# Patient Record
Sex: MALE | Race: WHITE | NOT HISPANIC OR LATINO | URBAN - METROPOLITAN AREA
[De-identification: names, ages, dates, MRNs, and addresses within clinical notes are randomized per-mention and may not be internally consistent; named-entity substitution may affect disease eponyms.]

---

## 2023-07-31 ENCOUNTER — EMERGENCY (EMERGENCY)
Facility: HOSPITAL | Age: 33
LOS: 1 days | Discharge: HOME | End: 2023-07-31
Attending: EMERGENCY MEDICINE
Payer: COMMERCIAL

## 2023-07-31 VITALS
HEIGHT: 74 IN | TEMPERATURE: 98 F | HEART RATE: 96 BPM | RESPIRATION RATE: 18 BRPM | DIASTOLIC BLOOD PRESSURE: 93 MMHG | SYSTOLIC BLOOD PRESSURE: 146 MMHG | OXYGEN SATURATION: 96 % | WEIGHT: 184.97 LBS

## 2023-07-31 VITALS
TEMPERATURE: 99 F | SYSTOLIC BLOOD PRESSURE: 133 MMHG | HEART RATE: 97 BPM | DIASTOLIC BLOOD PRESSURE: 77 MMHG | RESPIRATION RATE: 22 BRPM | OXYGEN SATURATION: 97 %

## 2023-07-31 LAB
ALBUMIN SERPL ELPH-MCNC: 3.3 G/DL — LOW (ref 3.5–5)
ALP SERPL-CCNC: 92 U/L — SIGNIFICANT CHANGE UP (ref 40–120)
ALT FLD-CCNC: 25 U/L DA — SIGNIFICANT CHANGE UP (ref 10–60)
ANION GAP SERPL CALC-SCNC: 5 MMOL/L — SIGNIFICANT CHANGE UP (ref 5–17)
APPEARANCE UR: CLEAR — SIGNIFICANT CHANGE UP
AST SERPL-CCNC: 21 U/L — SIGNIFICANT CHANGE UP (ref 10–40)
BASOPHILS # BLD AUTO: 0.05 K/UL — SIGNIFICANT CHANGE UP (ref 0–0.2)
BASOPHILS NFR BLD AUTO: 0.6 % — SIGNIFICANT CHANGE UP (ref 0–2)
BILIRUB SERPL-MCNC: 0.2 MG/DL — SIGNIFICANT CHANGE UP (ref 0.2–1.2)
BILIRUB UR-MCNC: NEGATIVE — SIGNIFICANT CHANGE UP
BUN SERPL-MCNC: 6 MG/DL — LOW (ref 7–18)
CALCIUM SERPL-MCNC: 8.8 MG/DL — SIGNIFICANT CHANGE UP (ref 8.4–10.5)
CHLORIDE SERPL-SCNC: 104 MMOL/L — SIGNIFICANT CHANGE UP (ref 96–108)
CO2 SERPL-SCNC: 29 MMOL/L — SIGNIFICANT CHANGE UP (ref 22–31)
COLOR SPEC: YELLOW — SIGNIFICANT CHANGE UP
CREAT SERPL-MCNC: 0.86 MG/DL — SIGNIFICANT CHANGE UP (ref 0.5–1.3)
DIFF PNL FLD: NEGATIVE — SIGNIFICANT CHANGE UP
EGFR: 117 ML/MIN/1.73M2 — SIGNIFICANT CHANGE UP
EOSINOPHIL # BLD AUTO: 0.6 K/UL — HIGH (ref 0–0.5)
EOSINOPHIL NFR BLD AUTO: 7.5 % — HIGH (ref 0–6)
ETHANOL SERPL-MCNC: <3 MG/DL — SIGNIFICANT CHANGE UP (ref 0–10)
GLUCOSE SERPL-MCNC: 97 MG/DL — SIGNIFICANT CHANGE UP (ref 70–99)
GLUCOSE UR QL: NEGATIVE — SIGNIFICANT CHANGE UP
HCT VFR BLD CALC: 42.6 % — SIGNIFICANT CHANGE UP (ref 39–50)
HGB BLD-MCNC: 14.2 G/DL — SIGNIFICANT CHANGE UP (ref 13–17)
IMM GRANULOCYTES NFR BLD AUTO: 0.3 % — SIGNIFICANT CHANGE UP (ref 0–0.9)
KETONES UR-MCNC: NEGATIVE — SIGNIFICANT CHANGE UP
LACTATE SERPL-SCNC: 1.1 MMOL/L — SIGNIFICANT CHANGE UP (ref 0.7–2)
LEUKOCYTE ESTERASE UR-ACNC: NEGATIVE — SIGNIFICANT CHANGE UP
LIDOCAIN IGE QN: 128 U/L — SIGNIFICANT CHANGE UP (ref 73–393)
LYMPHOCYTES # BLD AUTO: 2.52 K/UL — SIGNIFICANT CHANGE UP (ref 1–3.3)
LYMPHOCYTES # BLD AUTO: 31.5 % — SIGNIFICANT CHANGE UP (ref 13–44)
MCHC RBC-ENTMCNC: 30 PG — SIGNIFICANT CHANGE UP (ref 27–34)
MCHC RBC-ENTMCNC: 33.3 GM/DL — SIGNIFICANT CHANGE UP (ref 32–36)
MCV RBC AUTO: 89.9 FL — SIGNIFICANT CHANGE UP (ref 80–100)
MONOCYTES # BLD AUTO: 0.55 K/UL — SIGNIFICANT CHANGE UP (ref 0–0.9)
MONOCYTES NFR BLD AUTO: 6.9 % — SIGNIFICANT CHANGE UP (ref 2–14)
NEUTROPHILS # BLD AUTO: 4.25 K/UL — SIGNIFICANT CHANGE UP (ref 1.8–7.4)
NEUTROPHILS NFR BLD AUTO: 53.2 % — SIGNIFICANT CHANGE UP (ref 43–77)
NITRITE UR-MCNC: NEGATIVE — SIGNIFICANT CHANGE UP
NRBC # BLD: 0 /100 WBCS — SIGNIFICANT CHANGE UP (ref 0–0)
PH UR: 7 — SIGNIFICANT CHANGE UP (ref 5–8)
PLATELET # BLD AUTO: 296 K/UL — SIGNIFICANT CHANGE UP (ref 150–400)
POTASSIUM SERPL-MCNC: 3.8 MMOL/L — SIGNIFICANT CHANGE UP (ref 3.5–5.3)
POTASSIUM SERPL-SCNC: 3.8 MMOL/L — SIGNIFICANT CHANGE UP (ref 3.5–5.3)
PROT SERPL-MCNC: 7.4 G/DL — SIGNIFICANT CHANGE UP (ref 6–8.3)
PROT UR-MCNC: 15 MG/DL
RBC # BLD: 4.74 M/UL — SIGNIFICANT CHANGE UP (ref 4.2–5.8)
RBC # FLD: 12 % — SIGNIFICANT CHANGE UP (ref 10.3–14.5)
SODIUM SERPL-SCNC: 138 MMOL/L — SIGNIFICANT CHANGE UP (ref 135–145)
SP GR SPEC: 1.01 — SIGNIFICANT CHANGE UP (ref 1.01–1.02)
UROBILINOGEN FLD QL: NEGATIVE — SIGNIFICANT CHANGE UP
WBC # BLD: 7.99 K/UL — SIGNIFICANT CHANGE UP (ref 3.8–10.5)
WBC # FLD AUTO: 7.99 K/UL — SIGNIFICANT CHANGE UP (ref 3.8–10.5)

## 2023-07-31 PROCEDURE — 83690 ASSAY OF LIPASE: CPT

## 2023-07-31 PROCEDURE — 83605 ASSAY OF LACTIC ACID: CPT

## 2023-07-31 PROCEDURE — 81001 URINALYSIS AUTO W/SCOPE: CPT

## 2023-07-31 PROCEDURE — 93010 ELECTROCARDIOGRAM REPORT: CPT

## 2023-07-31 PROCEDURE — 85025 COMPLETE CBC W/AUTO DIFF WBC: CPT

## 2023-07-31 PROCEDURE — 70450 CT HEAD/BRAIN W/O DYE: CPT | Mod: MA

## 2023-07-31 PROCEDURE — 93005 ELECTROCARDIOGRAM TRACING: CPT

## 2023-07-31 PROCEDURE — 36415 COLL VENOUS BLD VENIPUNCTURE: CPT

## 2023-07-31 PROCEDURE — 99285 EMERGENCY DEPT VISIT HI MDM: CPT | Mod: 25

## 2023-07-31 PROCEDURE — 99285 EMERGENCY DEPT VISIT HI MDM: CPT

## 2023-07-31 PROCEDURE — 82962 GLUCOSE BLOOD TEST: CPT

## 2023-07-31 PROCEDURE — 80053 COMPREHEN METABOLIC PANEL: CPT

## 2023-07-31 PROCEDURE — 80307 DRUG TEST PRSMV CHEM ANLYZR: CPT

## 2023-07-31 PROCEDURE — 70450 CT HEAD/BRAIN W/O DYE: CPT | Mod: 26,MA

## 2023-07-31 RX ORDER — SODIUM CHLORIDE 9 MG/ML
1000 INJECTION INTRAMUSCULAR; INTRAVENOUS; SUBCUTANEOUS ONCE
Refills: 0 | Status: COMPLETED | OUTPATIENT
Start: 2023-07-31 | End: 2023-07-31

## 2023-07-31 RX ADMIN — SODIUM CHLORIDE 1000 MILLILITER(S): 9 INJECTION INTRAMUSCULAR; INTRAVENOUS; SUBCUTANEOUS at 18:04

## 2023-07-31 NOTE — ED PROVIDER NOTE - OBJECTIVE STATEMENT
32-year-old male with history of alcohol abuse, polysubstance use, presents with grandmother as grandmother states that she came up to his apartment and noted that he was making strange movements of his head to the side and thrusting his tongue and making noises with his lips. She states she thinks this may be secondary to his alcohol use, and also that he uses oxycodone as prescribed for chronic leg pain. She also states that he appeared to be doubled over in pain while making these noises. She denies all other specific symptoms or complaints. Patient himself states that he has been drinking alcohol as well as using his oxycodone only as prescribed, but when interviewed separately from grandmother states has been using cocaine as well over the past 3 days gotten from a party. He reports chronic liver pain, no acute change. When asked, states he may have hit his head a few days ago though does not think so. Denies chest pain, shortness of breath, vomiting, and all other symptoms. Denies history of seizures.

## 2023-07-31 NOTE — ED PROVIDER NOTE - PATIENT PORTAL LINK FT
You can access the FollowMyHealth Patient Portal offered by [facility] by registering at the following website: [email or web address]. By joining [employer]’s FollowMyHealth portal, you will also be able to view your health information using other applications (apps) compatible with our system.

## 2023-07-31 NOTE — ED PROVIDER NOTE - NSFOLLOWUPCLINICS_GEN_ALL_ED_FT
[facility]  Detox  [address] [place] [address]  Phone: [phone]  Fax: [phone]  Follow Up Time: Urgent    [facility]  Detox  [address] [place] [address]  Phone: [phone]  Fax: [phone]  Follow Up Time: Urgent    [facility]  Internal Medicine  [address] [place] [address]  Phone: [phone]  Fax: [phone]  Follow Up Time: Urgent

## 2023-07-31 NOTE — ED PROVIDER NOTE - NSFOLLOWUPINSTRUCTIONS_ED_ALL_ED_FT
Please follow up with your primary care doctor in 1-2 days.  Please stop using alcohol and substances.  Please return to the emergency department if you have a change in your mental status, worsening abdominal pain, vomiting, fever, or any other symptoms.      Substance Use  Substance use disorder occurs when a person's repeated use of drugs or alcohol interferes with the ability to be productive. This disorder can cause problems with mental and physical health. It can affect your ability to have healthy relationships, and it can keep you from being able to meet your responsibilities at work, home, or school. It can also lead to addiction, which is a condition in which you cannot stop using the substance consistently for a period of time.    Addiction changes the way the brain works. Because of these changes, addiction is a chronic condition. Substance use disorder can be mild, moderate, or severe.    Some commonly misused substances that can lead to this disorder include:  Alcohol.  Tobacco.  Marijuana.  Stimulants, such as cocaine and methamphetamine.  Hallucinogens, such as LSD and PCP.  Opioids, such as some prescription pain medicines and heroin.  What are the causes?  This condition may develop due to many complex social, psychological, or physical reasons, such as:  Stress.  Abuse.  Peer pressure.  Anxiety or depression.  What increases the risk?  This condition is more likely to develop in people who:  Use substances to cope with stress.  Have been abused.  Have a mental health disorder, such as depression.  Have a family history of substance use disorder.  What are the signs or symptoms?  Symptoms of this condition include:  Using the substance for longer periods of time or at a higher dosage than what is normal or intended.  Having a lasting desire to use the substance.  Being unable to slow down or stop the use of the substance.  Spending an abnormal amount of time getting the substance, using the substance, or recovering from using the substance.  Using the substance in a way that interferes with work, school, social activities, and personal relationships.  Using the substance even after having negative consequences, such as:  Health problems.  Legal or financial troubles.  Job loss.  Relationship problems.  Needing more and more of the substance to get the same effect (developing tolerance).  Experiencing unpleasant symptoms if you do not use the substance (withdrawal).  Using the substance to avoid withdrawal symptoms.  How is this diagnosed?  This condition may be diagnosed based on:  A physical exam.  Your history of substance use.  Your symptoms. This includes:  How substance use affects your life.  Changes in personality, behaviors, and mood.  Having at least two symptoms of substance use disorder within a 12-month period.  Health issues related to substance use, such as liver damage, shortness of breath, fatigue, cough, or heart problems.  Blood or urine tests to screen for alcohol and drugs.  How is this treated?  Three people participating in a support group.  This condition may be treated by:  Stopping substance use safely. This may require taking medicines and being closely monitored for several days.  Taking part in group and individual counseling from mental health providers who help people with substance use disorder.  Staying at a live-in (residential) treatment center for several days or weeks.  Attending daily counseling sessions at a treatment center.  Taking medicine as told by your health care provider:  To ease symptoms and prevent complications during withdrawal.  To treat other mental health issues, such as depression or anxiety.  To block cravings by causing the same effects as the substance.  To block the effects of the substance or replace good sensations with unpleasant ones.  Participating in a support group to share your experience with others who are going through the same thing. These groups are an important part of long-term recovery for many people.  Recovery can be a long process. Many people who undergo treatment start using the substance again after stopping (relapse). If you relapse, that does not mean that treatment will not work.    Follow these instructions at home:  A bottle of beer, a glass of wine, and a glass of hard liquor with a "do not drink" sign over them.   Take over-the-counter and prescription medicines only as told by your health care provider.  Do not use any drugs or alcohol.  Avoid temptations or triggers that you associate with your use of the substance.  Learn and practice techniques for managing stress.  Have a plan for vulnerable moments. Get phone numbers of people who are willing to help and who are committed to your recovery.  Attend support groups on a regular basis. These groups include 12-step programs like Alcoholics Anonymous and Narcotics Anonymous.  Keep all follow-up visits. This is important. This includes continuing to work with therapists and support groups.  Where to find more information  Substance Abuse and Mental Health Services Administration (SAMHSA): www.samhsa.gov  National [place]ness (HANK): www.hank.org  Contact a health care provider if:  You cannot take your medicines as told.  Your symptoms get worse.  You have trouble resisting the urge to use drugs or alcohol.  Get help right away if:  You relapse.  You think that you may have taken too much of a drug. The National Poison Control Center hotline is 1-838.977.4663.  You have signs of an overdose. Symptoms include:  Chest pain.  Confusion.  Sleepiness or difficulty staying awake.  Slowed breathing.  Nausea or vomiting.  A seizure.  You have serious thoughts about hurting yourself or someone else.  Drug overdose is an emergency. Do not wait to see if the symptoms will go away. Get medical help right away. Call your local emergency services (353 in the [place] Do not drive yourself to the hospital.    If you ever feel like you may hurt yourself or others, or have thoughts about taking your own life, get help right away. Go to your nearest emergency department or:  Call your local emergency services (722 in the [place]  Call a suicide crisis helpline, such as the National Suicide Prevention Lifeline at 1-463.617.5327 or 132 in the [place] This is open 24 hours a day in the [place]  Text the Crisis Text Line at 892947 (in the [place]  Summary  Substance use disorder occurs when a person's repeated use of drugs or alcohol interferes with the ability to be productive.  Taking part in group and individual counseling from mental health providers is a common treatment for people with substance use disorder.  Recovery can be a long process. Many people who undergo treatment start using the substance again after stopping (relapse). A relapse does not mean that treatment will not work.  Attend support groups such as Alcoholics Anonymous and Narcotics Anonymous. These groups are an important part of long-term recovery for many people.  This information is not intended to replace advice given to you by your health care provider. Make sure you discuss any questions you have with your health care provider. Please follow up with your primary care doctor in 1-2 days, and detox tomorrow.  Please stop using alcohol and substances.  Please return to the emergency department if you have a change in your mental status, worsening abdominal pain, vomiting, fever, or any other symptoms.      Substance Use  Substance use disorder occurs when a person's repeated use of drugs or alcohol interferes with the ability to be productive. This disorder can cause problems with mental and physical health. It can affect your ability to have healthy relationships, and it can keep you from being able to meet your responsibilities at work, home, or school. It can also lead to addiction, which is a condition in which you cannot stop using the substance consistently for a period of time.    Addiction changes the way the brain works. Because of these changes, addiction is a chronic condition. Substance use disorder can be mild, moderate, or severe.    Some commonly misused substances that can lead to this disorder include:  Alcohol.  Tobacco.  Marijuana.  Stimulants, such as cocaine and methamphetamine.  Hallucinogens, such as LSD and PCP.  Opioids, such as some prescription pain medicines and heroin.  What are the causes?  This condition may develop due to many complex social, psychological, or physical reasons, such as:  Stress.  Abuse.  Peer pressure.  Anxiety or depression.  What increases the risk?  This condition is more likely to develop in people who:  Use substances to cope with stress.  Have been abused.  Have a mental health disorder, such as depression.  Have a family history of substance use disorder.  What are the signs or symptoms?  Symptoms of this condition include:  Using the substance for longer periods of time or at a higher dosage than what is normal or intended.  Having a lasting desire to use the substance.  Being unable to slow down or stop the use of the substance.  Spending an abnormal amount of time getting the substance, using the substance, or recovering from using the substance.  Using the substance in a way that interferes with work, school, social activities, and personal relationships.  Using the substance even after having negative consequences, such as:  Health problems.  Legal or financial troubles.  Job loss.  Relationship problems.  Needing more and more of the substance to get the same effect (developing tolerance).  Experiencing unpleasant symptoms if you do not use the substance (withdrawal).  Using the substance to avoid withdrawal symptoms.  How is this diagnosed?  This condition may be diagnosed based on:  A physical exam.  Your history of substance use.  Your symptoms. This includes:  How substance use affects your life.  Changes in personality, behaviors, and mood.  Having at least two symptoms of substance use disorder within a 12-month period.  Health issues related to substance use, such as liver damage, shortness of breath, fatigue, cough, or heart problems.  Blood or urine tests to screen for alcohol and drugs.  How is this treated?  Three people participating in a support group.  This condition may be treated by:  Stopping substance use safely. This may require taking medicines and being closely monitored for several days.  Taking part in group and individual counseling from mental health providers who help people with substance use disorder.  Staying at a live-in (residential) treatment center for several days or weeks.  Attending daily counseling sessions at a treatment center.  Taking medicine as told by your health care provider:  To ease symptoms and prevent complications during withdrawal.  To treat other mental health issues, such as depression or anxiety.  To block cravings by causing the same effects as the substance.  To block the effects of the substance or replace good sensations with unpleasant ones.  Participating in a support group to share your experience with others who are going through the same thing. These groups are an important part of long-term recovery for many people.  Recovery can be a long process. Many people who undergo treatment start using the substance again after stopping (relapse). If you relapse, that does not mean that treatment will not work.    Follow these instructions at home:  A bottle of beer, a glass of wine, and a glass of hard liquor with a "do not drink" sign over them.   Take over-the-counter and prescription medicines only as told by your health care provider.  Do not use any drugs or alcohol.  Avoid temptations or triggers that you associate with your use of the substance.  Learn and practice techniques for managing stress.  Have a plan for vulnerable moments. Get phone numbers of people who are willing to help and who are committed to your recovery.  Attend support groups on a regular basis. These groups include 12-step programs like Alcoholics Anonymous and Narcotics Anonymous.  Keep all follow-up visits. This is important. This includes continuing to work with therapists and support groups.  Where to find more information  Substance Abuse and Mental Health Services Administration (SAMHSA): www.samhsa.gov  National [place] (HANK): www.hank.org  Contact a health care provider if:  You cannot take your medicines as told.  Your symptoms get worse.  You have trouble resisting the urge to use drugs or alcohol.  Get help right away if:  You relapse.  You think that you may have taken too much of a drug. The National Poison Control Center hotline is 1-824.115.2392.  You have signs of an overdose. Symptoms include:  Chest pain.  Confusion.  Sleepiness or difficulty staying awake.  Slowed breathing.  Nausea or vomiting.  A seizure.  You have serious thoughts about hurting yourself or someone else.  Drug overdose is an emergency. Do not wait to see if the symptoms will go away. Get medical help right away. Call your local emergency services (492 in the [place] Do not drive yourself to the hospital.    If you ever feel like you may hurt yourself or others, or have thoughts about taking your own life, get help right away. Go to your nearest emergency department or:  Call your local emergency services (208 in the [place]  Call a suicide crisis helpline, such as the National Suicide Prevention Lifeline at 1-840.882.4163 or 733 in the [place] This is open 24 hours a day in the [place]  Text the Crisis Text Line at 333071 (in the [place]  Summary  Substance use disorder occurs when a person's repeated use of drugs or alcohol interferes with the ability to be productive.  Taking part in group and individual counseling from mental health providers is a common treatment for people with substance use disorder.  Recovery can be a long process. Many people who undergo treatment start using the substance again after stopping (relapse). A relapse does not mean that treatment will not work.  Attend support groups such as Alcoholics Anonymous and Narcotics Anonymous. These groups are an important part of long-term recovery for many people.  This information is not intended to replace advice given to you by your health care provider. Make sure you discuss any questions you have with your health care provider.

## 2023-07-31 NOTE — ED ADULT NURSE NOTE - OBJECTIVE STATEMENT
Patient presented to the ED with c/o RUQ pain. As per pt the pain is coming from his liver. H/o ETOH. Denies any nausea, vomiting diarrhea and constipation. Pt took 30mg of Oxycodone for pain around 11AM as per pt.

## 2023-07-31 NOTE — ED ADULT NURSE REASSESSMENT NOTE - NS ED NURSE REASSESS COMMENT FT1
assumed pt care at 1910 - pt currently asleep, not in any distress. family at bedside.  2040 Dr. Garcia at bedside - pt able to ambulate with steady gait. provider at bedside explaining importance of alcohol cessation.

## 2023-07-31 NOTE — ED ADULT NURSE NOTE - NSFALLRISKINTERV_ED_ALL_ED
Assistance OOB with selected safe patient handling equipment if applicable/Assistance with ambulation/Communicate fall risk and risk factors to all staff, patient, and family/Monitor gait and stability/Monitor for mental status changes and reorient to person, place, and time, as needed/Provide visual cue: yellow wristband, yellow gown, etc/Reinforce activity limits and safety measures with patient and family/Toileting schedule using arm’s reach rule for commode and bathroom/Use of alarms - bed, stretcher, chair and/or video monitoring/Call bell, personal items and telephone in reach/Instruct patient to call for assistance before getting out of bed/chair/stretcher/Non-slip footwear applied when patient is off stretcher/[place] to call system/Physically safe environment - no spills, clutter or unnecessary equipment/Purposeful Proactive Rounding/Room/bathroom lighting operational, light cord in reach

## 2023-07-31 NOTE — ED PROVIDER NOTE - CLINICAL SUMMARY MEDICAL DECISION MAKING FREE TEXT BOX
Character low suspicion for seizure, and patient with no history of seizures and was alert during episodes. Grandmother also states she has seen grand mal seizures in the past and this did not appear to be long. No evidence of serotonin syndrome or neuroleptic malignant syndrome. Character low suspicion for ICH, and CT head unremarkable. _______. No abdominal tenderness or acute pain, with low suspicion for acute intra-abdominal process, and LFTs unremarkable. Character could be consistent with patient's cocaine use. Patient with some mild hyperactivity noted here, however nontoxic and controlled. Self ambulatory without issues. Given IV fluids. Character low suspicion for seizure, and patient with no history of seizures and was alert during episodes. Grandmother also states she has seen grand mal seizures in the past and this did not appear to be long. No evidence of serotonin syndrome or neuroleptic malignant syndrome. Character low suspicion for ICH, and CT head unremarkable. No abdominal tenderness or acute pain, with low suspicion for acute intra-abdominal process, and LFTs unremarkable. Character could be consistent with patient's cocaine use. Patient with some mild hyperactivity noted here, however nontoxic and controlled. Self ambulatory without issues. Given IV fluids. Character low suspicion for seizure, and patient with no history of seizures and was alert during episodes. Grandmother also states she has seen grand mal seizures in the past and this did not appear to be long. No evidence of serotonin syndrome or neuroleptic malignant syndrome. Character low suspicion for ICH, and CT head unremarkable. No abdominal tenderness or acute pain, with low suspicion for acute intra-abdominal process, and LFTs unremarkable. Character could be consistent with patient's cocaine use. Patient with some mild hyperactivity noted here, however nontoxic and controlled. Self ambulatory without issues. Given IV fluids. Patient is well appearing, NAD, afebrile, hemodynamically stable. Walking independently and stably and improved movements and does not want to be admitted. Any available tests and studies were discussed with patient and mother at bedside. Discharged with instructions in further symptomatic care, return precautions, and need for PMD establishment and detox f/u tomorrow.

## 2023-07-31 NOTE — ED PROVIDER NOTE - PHYSICAL EXAMINATION
Afebrile, hemodynamically stable, saturating well on room air  NAD, well appearing, sleeping comfortably in bed, easily awakened, no WOB/tachypnea/bradypnea, speaking full sentences  Head NCAT  EOMI, anicteric  MMM  RRR, nml S1/S2, no m/r/g  Lungs CTAB, no w/r/r  Abd soft, NT, ND, nml BS, no rebound or guarding  AAO, CN's 3-12 grossly intact  CHARLES spontaneously, no leg cyanosis or edema, no clonus or stiffness  Skin warm, well perfused, no rashes or hives

## 2023-11-21 ENCOUNTER — INPATIENT (INPATIENT)
Facility: HOSPITAL | Age: 33
LOS: 8 days | Discharge: HOME | End: 2023-11-30
Attending: STUDENT IN AN ORGANIZED HEALTH CARE EDUCATION/TRAINING PROGRAM | Admitting: PSYCHIATRY & NEUROLOGY
Payer: MEDICAID

## 2023-11-21 VITALS
OXYGEN SATURATION: 100 % | SYSTOLIC BLOOD PRESSURE: 163 MMHG | HEART RATE: 103 BPM | RESPIRATION RATE: 18 BRPM | TEMPERATURE: 99 F | DIASTOLIC BLOOD PRESSURE: 110 MMHG

## 2023-11-21 DIAGNOSIS — Z78.9 OTHER SPECIFIED HEALTH STATUS: ICD-10-CM

## 2023-11-21 DIAGNOSIS — F14.11 COCAINE ABUSE, IN REMISSION: ICD-10-CM

## 2023-11-21 DIAGNOSIS — F10.10 ALCOHOL ABUSE, UNCOMPLICATED: ICD-10-CM

## 2023-11-21 DIAGNOSIS — F14.10 COCAINE ABUSE, UNCOMPLICATED: ICD-10-CM

## 2023-11-21 DIAGNOSIS — F43.21 ADJUSTMENT DISORDER WITH DEPRESSED MOOD: ICD-10-CM

## 2023-11-21 DIAGNOSIS — F32.9 MAJOR DEPRESSIVE DISORDER, SINGLE EPISODE, UNSPECIFIED: ICD-10-CM

## 2023-11-21 LAB
ALBUMIN SERPL ELPH-MCNC: 4.7 G/DL — SIGNIFICANT CHANGE UP (ref 3.3–5)
ALBUMIN SERPL ELPH-MCNC: 4.7 G/DL — SIGNIFICANT CHANGE UP (ref 3.3–5)
ALP SERPL-CCNC: 87 U/L — SIGNIFICANT CHANGE UP (ref 40–120)
ALP SERPL-CCNC: 87 U/L — SIGNIFICANT CHANGE UP (ref 40–120)
ALT FLD-CCNC: 15 U/L — SIGNIFICANT CHANGE UP (ref 4–41)
ALT FLD-CCNC: 15 U/L — SIGNIFICANT CHANGE UP (ref 4–41)
ANION GAP SERPL CALC-SCNC: 12 MMOL/L — SIGNIFICANT CHANGE UP (ref 7–14)
ANION GAP SERPL CALC-SCNC: 12 MMOL/L — SIGNIFICANT CHANGE UP (ref 7–14)
APPEARANCE UR: ABNORMAL
APPEARANCE UR: ABNORMAL
AST SERPL-CCNC: 12 U/L — SIGNIFICANT CHANGE UP (ref 4–40)
AST SERPL-CCNC: 12 U/L — SIGNIFICANT CHANGE UP (ref 4–40)
BASOPHILS # BLD AUTO: 0.07 K/UL — SIGNIFICANT CHANGE UP (ref 0–0.2)
BASOPHILS # BLD AUTO: 0.07 K/UL — SIGNIFICANT CHANGE UP (ref 0–0.2)
BASOPHILS NFR BLD AUTO: 0.7 % — SIGNIFICANT CHANGE UP (ref 0–2)
BASOPHILS NFR BLD AUTO: 0.7 % — SIGNIFICANT CHANGE UP (ref 0–2)
BILIRUB SERPL-MCNC: 0.4 MG/DL — SIGNIFICANT CHANGE UP (ref 0.2–1.2)
BILIRUB SERPL-MCNC: 0.4 MG/DL — SIGNIFICANT CHANGE UP (ref 0.2–1.2)
BILIRUB UR-MCNC: NEGATIVE — SIGNIFICANT CHANGE UP
BILIRUB UR-MCNC: NEGATIVE — SIGNIFICANT CHANGE UP
BUN SERPL-MCNC: 15 MG/DL — SIGNIFICANT CHANGE UP (ref 7–23)
BUN SERPL-MCNC: 15 MG/DL — SIGNIFICANT CHANGE UP (ref 7–23)
CALCIUM SERPL-MCNC: 9.6 MG/DL — SIGNIFICANT CHANGE UP (ref 8.4–10.5)
CALCIUM SERPL-MCNC: 9.6 MG/DL — SIGNIFICANT CHANGE UP (ref 8.4–10.5)
CHLORIDE SERPL-SCNC: 100 MMOL/L — SIGNIFICANT CHANGE UP (ref 98–107)
CHLORIDE SERPL-SCNC: 100 MMOL/L — SIGNIFICANT CHANGE UP (ref 98–107)
CO2 SERPL-SCNC: 26 MMOL/L — SIGNIFICANT CHANGE UP (ref 22–31)
CO2 SERPL-SCNC: 26 MMOL/L — SIGNIFICANT CHANGE UP (ref 22–31)
COLOR SPEC: YELLOW — SIGNIFICANT CHANGE UP
COLOR SPEC: YELLOW — SIGNIFICANT CHANGE UP
CREAT SERPL-MCNC: 0.7 MG/DL — SIGNIFICANT CHANGE UP (ref 0.5–1.3)
CREAT SERPL-MCNC: 0.7 MG/DL — SIGNIFICANT CHANGE UP (ref 0.5–1.3)
DIFF PNL FLD: NEGATIVE — SIGNIFICANT CHANGE UP
DIFF PNL FLD: NEGATIVE — SIGNIFICANT CHANGE UP
EGFR: 125 ML/MIN/1.73M2 — SIGNIFICANT CHANGE UP
EGFR: 125 ML/MIN/1.73M2 — SIGNIFICANT CHANGE UP
EOSINOPHIL # BLD AUTO: 0.88 K/UL — HIGH (ref 0–0.5)
EOSINOPHIL # BLD AUTO: 0.88 K/UL — HIGH (ref 0–0.5)
EOSINOPHIL NFR BLD AUTO: 8.4 % — HIGH (ref 0–6)
EOSINOPHIL NFR BLD AUTO: 8.4 % — HIGH (ref 0–6)
GLUCOSE SERPL-MCNC: 89 MG/DL — SIGNIFICANT CHANGE UP (ref 70–99)
GLUCOSE SERPL-MCNC: 89 MG/DL — SIGNIFICANT CHANGE UP (ref 70–99)
GLUCOSE UR QL: NEGATIVE MG/DL — SIGNIFICANT CHANGE UP
GLUCOSE UR QL: NEGATIVE MG/DL — SIGNIFICANT CHANGE UP
HCT VFR BLD CALC: 42.2 % — SIGNIFICANT CHANGE UP (ref 39–50)
HCT VFR BLD CALC: 42.2 % — SIGNIFICANT CHANGE UP (ref 39–50)
HGB BLD-MCNC: 13.7 G/DL — SIGNIFICANT CHANGE UP (ref 13–17)
HGB BLD-MCNC: 13.7 G/DL — SIGNIFICANT CHANGE UP (ref 13–17)
IANC: 5.46 K/UL — SIGNIFICANT CHANGE UP (ref 1.8–7.4)
IANC: 5.46 K/UL — SIGNIFICANT CHANGE UP (ref 1.8–7.4)
IMM GRANULOCYTES NFR BLD AUTO: 0.2 % — SIGNIFICANT CHANGE UP (ref 0–0.9)
IMM GRANULOCYTES NFR BLD AUTO: 0.2 % — SIGNIFICANT CHANGE UP (ref 0–0.9)
KETONES UR-MCNC: NEGATIVE MG/DL — SIGNIFICANT CHANGE UP
KETONES UR-MCNC: NEGATIVE MG/DL — SIGNIFICANT CHANGE UP
LEUKOCYTE ESTERASE UR-ACNC: NEGATIVE — SIGNIFICANT CHANGE UP
LEUKOCYTE ESTERASE UR-ACNC: NEGATIVE — SIGNIFICANT CHANGE UP
LYMPHOCYTES # BLD AUTO: 3.53 K/UL — HIGH (ref 1–3.3)
LYMPHOCYTES # BLD AUTO: 3.53 K/UL — HIGH (ref 1–3.3)
LYMPHOCYTES # BLD AUTO: 33.7 % — SIGNIFICANT CHANGE UP (ref 13–44)
LYMPHOCYTES # BLD AUTO: 33.7 % — SIGNIFICANT CHANGE UP (ref 13–44)
MCHC RBC-ENTMCNC: 28.5 PG — SIGNIFICANT CHANGE UP (ref 27–34)
MCHC RBC-ENTMCNC: 28.5 PG — SIGNIFICANT CHANGE UP (ref 27–34)
MCHC RBC-ENTMCNC: 32.5 GM/DL — SIGNIFICANT CHANGE UP (ref 32–36)
MCHC RBC-ENTMCNC: 32.5 GM/DL — SIGNIFICANT CHANGE UP (ref 32–36)
MCV RBC AUTO: 87.9 FL — SIGNIFICANT CHANGE UP (ref 80–100)
MCV RBC AUTO: 87.9 FL — SIGNIFICANT CHANGE UP (ref 80–100)
MONOCYTES # BLD AUTO: 0.53 K/UL — SIGNIFICANT CHANGE UP (ref 0–0.9)
MONOCYTES # BLD AUTO: 0.53 K/UL — SIGNIFICANT CHANGE UP (ref 0–0.9)
MONOCYTES NFR BLD AUTO: 5.1 % — SIGNIFICANT CHANGE UP (ref 2–14)
MONOCYTES NFR BLD AUTO: 5.1 % — SIGNIFICANT CHANGE UP (ref 2–14)
NEUTROPHILS # BLD AUTO: 5.46 K/UL — SIGNIFICANT CHANGE UP (ref 1.8–7.4)
NEUTROPHILS # BLD AUTO: 5.46 K/UL — SIGNIFICANT CHANGE UP (ref 1.8–7.4)
NEUTROPHILS NFR BLD AUTO: 51.9 % — SIGNIFICANT CHANGE UP (ref 43–77)
NEUTROPHILS NFR BLD AUTO: 51.9 % — SIGNIFICANT CHANGE UP (ref 43–77)
NITRITE UR-MCNC: NEGATIVE — SIGNIFICANT CHANGE UP
NITRITE UR-MCNC: NEGATIVE — SIGNIFICANT CHANGE UP
NRBC # BLD: 0 /100 WBCS — SIGNIFICANT CHANGE UP (ref 0–0)
NRBC # BLD: 0 /100 WBCS — SIGNIFICANT CHANGE UP (ref 0–0)
NRBC # FLD: 0 K/UL — SIGNIFICANT CHANGE UP (ref 0–0)
NRBC # FLD: 0 K/UL — SIGNIFICANT CHANGE UP (ref 0–0)
PCP SPEC-MCNC: SIGNIFICANT CHANGE UP
PCP SPEC-MCNC: SIGNIFICANT CHANGE UP
PH UR: 5.5 — SIGNIFICANT CHANGE UP (ref 5–8)
PH UR: 5.5 — SIGNIFICANT CHANGE UP (ref 5–8)
PLATELET # BLD AUTO: 281 K/UL — SIGNIFICANT CHANGE UP (ref 150–400)
PLATELET # BLD AUTO: 281 K/UL — SIGNIFICANT CHANGE UP (ref 150–400)
POTASSIUM SERPL-MCNC: 3.6 MMOL/L — SIGNIFICANT CHANGE UP (ref 3.5–5.3)
POTASSIUM SERPL-MCNC: 3.6 MMOL/L — SIGNIFICANT CHANGE UP (ref 3.5–5.3)
POTASSIUM SERPL-SCNC: 3.6 MMOL/L — SIGNIFICANT CHANGE UP (ref 3.5–5.3)
POTASSIUM SERPL-SCNC: 3.6 MMOL/L — SIGNIFICANT CHANGE UP (ref 3.5–5.3)
PROT SERPL-MCNC: 7.5 G/DL — SIGNIFICANT CHANGE UP (ref 6–8.3)
PROT SERPL-MCNC: 7.5 G/DL — SIGNIFICANT CHANGE UP (ref 6–8.3)
PROT UR-MCNC: SIGNIFICANT CHANGE UP MG/DL
PROT UR-MCNC: SIGNIFICANT CHANGE UP MG/DL
RBC # BLD: 4.8 M/UL — SIGNIFICANT CHANGE UP (ref 4.2–5.8)
RBC # BLD: 4.8 M/UL — SIGNIFICANT CHANGE UP (ref 4.2–5.8)
RBC # FLD: 13.1 % — SIGNIFICANT CHANGE UP (ref 10.3–14.5)
RBC # FLD: 13.1 % — SIGNIFICANT CHANGE UP (ref 10.3–14.5)
SARS-COV-2 RNA SPEC QL NAA+PROBE: SIGNIFICANT CHANGE UP
SARS-COV-2 RNA SPEC QL NAA+PROBE: SIGNIFICANT CHANGE UP
SODIUM SERPL-SCNC: 138 MMOL/L — SIGNIFICANT CHANGE UP (ref 135–145)
SODIUM SERPL-SCNC: 138 MMOL/L — SIGNIFICANT CHANGE UP (ref 135–145)
SP GR SPEC: 1.01 — SIGNIFICANT CHANGE UP (ref 1–1.03)
SP GR SPEC: 1.01 — SIGNIFICANT CHANGE UP (ref 1–1.03)
TOXICOLOGY SCREEN, DRUGS OF ABUSE, SERUM RESULT: SIGNIFICANT CHANGE UP
TOXICOLOGY SCREEN, DRUGS OF ABUSE, SERUM RESULT: SIGNIFICANT CHANGE UP
TSH SERPL-MCNC: 2.14 UIU/ML — SIGNIFICANT CHANGE UP (ref 0.27–4.2)
TSH SERPL-MCNC: 2.14 UIU/ML — SIGNIFICANT CHANGE UP (ref 0.27–4.2)
UROBILINOGEN FLD QL: 0.2 MG/DL — SIGNIFICANT CHANGE UP (ref 0.2–1)
UROBILINOGEN FLD QL: 0.2 MG/DL — SIGNIFICANT CHANGE UP (ref 0.2–1)
WBC # BLD: 10.49 K/UL — SIGNIFICANT CHANGE UP (ref 3.8–10.5)
WBC # BLD: 10.49 K/UL — SIGNIFICANT CHANGE UP (ref 3.8–10.5)
WBC # FLD AUTO: 10.49 K/UL — SIGNIFICANT CHANGE UP (ref 3.8–10.5)
WBC # FLD AUTO: 10.49 K/UL — SIGNIFICANT CHANGE UP (ref 3.8–10.5)

## 2023-11-21 PROCEDURE — 99285 EMERGENCY DEPT VISIT HI MDM: CPT

## 2023-11-21 PROCEDURE — 99285 EMERGENCY DEPT VISIT HI MDM: CPT | Mod: 25

## 2023-11-21 RX ORDER — NICOTINE POLACRILEX 2 MG
1 GUM BUCCAL ONCE
Refills: 0 | Status: DISCONTINUED | OUTPATIENT
Start: 2023-11-21 | End: 2023-11-30

## 2023-11-21 RX ORDER — HALOPERIDOL DECANOATE 100 MG/ML
5 INJECTION INTRAMUSCULAR EVERY 6 HOURS
Refills: 0 | Status: DISCONTINUED | OUTPATIENT
Start: 2023-11-21 | End: 2023-11-21

## 2023-11-21 RX ORDER — SERTRALINE 25 MG/1
25 TABLET, FILM COATED ORAL DAILY
Refills: 0 | Status: DISCONTINUED | OUTPATIENT
Start: 2023-11-22 | End: 2023-11-23

## 2023-11-21 RX ORDER — NICOTINE POLACRILEX 2 MG
4 GUM BUCCAL
Refills: 0 | Status: DISCONTINUED | OUTPATIENT
Start: 2023-11-21 | End: 2023-11-30

## 2023-11-21 RX ORDER — ACETAMINOPHEN 500 MG
650 TABLET ORAL EVERY 6 HOURS
Refills: 0 | Status: DISCONTINUED | OUTPATIENT
Start: 2023-11-21 | End: 2023-11-30

## 2023-11-21 RX ORDER — OLANZAPINE 15 MG/1
5 TABLET, FILM COATED ORAL EVERY 6 HOURS
Refills: 0 | Status: DISCONTINUED | OUTPATIENT
Start: 2023-11-21 | End: 2023-11-30

## 2023-11-21 RX ORDER — OLANZAPINE 15 MG/1
5 TABLET, FILM COATED ORAL ONCE
Refills: 0 | Status: DISCONTINUED | OUTPATIENT
Start: 2023-11-21 | End: 2023-11-30

## 2023-11-21 RX ORDER — THIAMINE MONONITRATE (VIT B1) 100 MG
100 TABLET ORAL DAILY
Refills: 0 | Status: COMPLETED | OUTPATIENT
Start: 2023-11-21 | End: 2023-11-24

## 2023-11-21 RX ORDER — FOLIC ACID 0.8 MG
1 TABLET ORAL DAILY
Refills: 0 | Status: COMPLETED | OUTPATIENT
Start: 2023-11-21 | End: 2023-11-28

## 2023-11-21 RX ORDER — LANOLIN ALCOHOL/MO/W.PET/CERES
5 CREAM (GRAM) TOPICAL AT BEDTIME
Refills: 0 | Status: DISCONTINUED | OUTPATIENT
Start: 2023-11-21 | End: 2023-11-30

## 2023-11-21 RX ORDER — NICOTINE POLACRILEX 2 MG
1 GUM BUCCAL DAILY
Refills: 0 | Status: DISCONTINUED | OUTPATIENT
Start: 2023-11-22 | End: 2023-11-30

## 2023-11-21 NOTE — BH PATIENT PROFILE - NSPROSPHOSPCHAPLAINYN_GEN_A_NUR
Interspace located. Using sterile technique, the area was anesthetized. The needle was slowly inserted and advanced at the appropriate angle into the subarachnoid space to allow CSF return. Stylet withdrawn. Cerebral Spinal Fluid was evaluated and any required specimens were drawn. Stylet replaced, needle removed, skin cleaned, sterile dressing applied. Patient placed in supine position. no

## 2023-11-21 NOTE — ED BEHAVIORAL HEALTH ASSESSMENT NOTE - VETERAN
[facility]    History and Physical - Hospitalist Service       Date of Admission:  10/15/2023    Assessment & Plan      Jona Romero is a 11 month old male admitted on 10/15/2023. He has no significant past medical history and is admitted for cough and increased work of breathing secondary to confirmed RSV and parainfluenza infections.    Currently comfortable with 1 L LFNC. Will continue to monitor respiratory status and adjust oxygen needs as necessary. Will also provide supportive cares such as suctioning, Tylenol PRN, and mIVF hydration if needed. Informed parents that pt is in the midst of peak illness (day 4), should hopefully start to turn around soon.     Cough  Increased work of breathing  Confirmed RSV and parainfluenza in the ED. Currently on 1L LFNC.  - Continue to wean oxygen support as tolerated or escalate as necessary  - Tylenol PRN, ibuprofen PRN  - Suctioning PRN, nasal spray PRN  - Can consider CXR, other infectious workup if clinically worsens.    FEN/GI  - Regular diet as tolerated, breastmilk/formula ad alexa  - No mIVF needed now, can consider if having poor PO intake    Diet: Regular, breastmilk/formula ad alexa  DVT Prophylaxis: Low Risk/Ambulatory with no VTE prophylaxis indicated  Kelly Catheter: Not present  Fluids: None  Lines: None     Cardiac Monitoring: None  Code Status: Full    Clinically Significant Risk Factors Present on Admission        Disposition Plan   Expected discharge:    Expected Discharge Date: 10/16/2023         Recommended to discharge home once able to sufficiently oxygenate without extra support.     The patient's care was discussed with the Attending Physician, Dr. Ady Beach and Patient's Family.    uQang Vicente MD  Hospitalist Service, Pediatrics Resident PGY-1  [facility]  Securely message with Flipzu (more info)  Text page via MailTime Paging/Directory  "  ______________________________________________________________________    Chief Complaint   Cough  Increased work of breathing    History is obtained from the patient's parents.    History of Present Illness   Jona Romero is a 11 month old male who has no significant past medical history and is admitted for cough and increased work of breathing.    Patient began having fever, cough, congestion, and NBNB emesis on 10/11. He went to a clinic in [place] (suburb of [place]) where it was discovered that he had bilateral acute otitis media. He was prescribed amoxicillin. However, despite taking this and Tylenol at home, his symptoms did not improve, so parents took him to [facility] ED on early morning of 10/14. His ears showed improvement and thus was advised to stop the amoxicillin. He was well-appearing with no respiratory distress and was able to tolerate a PO challenge after a dose of Zofran, so he was sent home with return precautions. Unfortunately, while his fever, congestion, and emesis improved during the day, his cough was still persistent, and he started to show signs of increased work of breathing. So, he was brought to the ED overnight. Denies rash, diarrhea, constipation. Notably, mom has been sick with runny nose and fever for past two days.    In the ED, patient arrived afebrile with O2 sat at 93%. While he did not show signs of respiratory distress, he did sound \"tight\" and thus was given duoneb x1. He somewhat improved with only occasional brief desats. However, while asleep, he began to consistently dip into the low 80s and thus 1 L of LFNC was begun. He otherwise was well-appearing and had good PO intake without emesis. It was decided to admit him to continue providing oxygen assistance.    Past Medical History    History reviewed. No pertinent past medical history.    Past Surgical History   History reviewed. No pertinent surgical history.    Prior to Admission Medications   Prior to " Admission Medications   Prescriptions Last Dose Informant Patient Reported? Taking?   acetaminophen (TYLENOL) 160 MG/5ML suspension   No No   Sig: Take 5 mLs (160 mg) by mouth every 6 hours as needed for fever or mild pain   amoxicillin (AMOXIL) 400 MG/5ML suspension   Yes No   Sig: Take 400 mg by mouth 2 times daily   ibuprofen (ADVIL/MOTRIN) 100 MG/5ML suspension   No No   Sig: Take 5 mLs (100 mg) by mouth every 6 hours as needed for pain or fever   ondansetron (ZOFRAN) 4 MG/5ML solution   No No   Sig: Take 2.5 mLs (2 mg) by mouth every 8 hours as needed for nausea or vomiting      Facility-Administered Medications: None        Review of Systems    The 10 point Review of Systems is negative other than noted in the HPI.    Social History   I have reviewed this patient's social history and updated it with pertinent information if needed.  Pediatric History   Patient Parents    Ariane Morgan (Mother)    Yamile English (Father)     Other Topics Concern    Not on file   Social History Narrative    Not on file     Immunizations   Immunization Status:  up to date and documented    Allergies   No Known Allergies     Physical Exam   Vital Signs: Temp: 99.7  F (37.6  C) Temp src: Tympanic   Pulse: 138   Resp: 38 SpO2: 96 % O2 Device: Nasal cannula Oxygen Delivery: 1 LPM  Weight: 0 lbs 0 oz    GENERAL: Comfortably asleep in dad's arms, in no acute distress. Has LFNC.  SKIN: Clear. No significant rash, abnormal pigmentation or lesions  HEAD: Normocephalic. Normal fontanels and sutures.  EYES: Deferred as patient was asleep.  NOSE: Normal without discharge.  NECK: Supple, no masses.  LYMPH NODES: No adenopathy  LUNGS: Mildly coarse lung sounds at bilateral bases.  HEART: Regular rhythm. Normal S1/S2. No murmurs.  ABDOMEN: Soft, non-tender, not distended. Normal bowel sounds.   GENITALIA: Normal male external genitalia. Testes descended bilaterally.   EXTREMITIES: Symmetric extremities, no deformities    Medical Decision Making        Please see A&P for additional details of medical decision making.      Data         Imaging results reviewed over the past 24 hrs:   No results found for this or any previous visit (from the past 24 hour(s)).   No

## 2023-11-21 NOTE — BH INPATIENT PSYCHIATRY ASSESSMENT NOTE - NSBHASSESSSUMMFT_PSY_ALL_CORE
31 yo male, single, childless, unemployed, undomiciled, psychiatric diagnosis of depression, no prior hospitalizations, not currently in treatment, polysubstance use (crack cocaine, heroin, alcohol), denies legal / violence hx, medical diagnosis of HTN not on medications, BIB self and admitted 9.13 due to worsening low mood and SI.    Patient presenting with symptoms consistent with depression in setting of psychosocial stressors (no job, homelessness) and active substance use. utox + for cocaine and patient reports recent crack cocaine use. pt denies current or past sx of tennille. Patient agreeable to trial with Sertraline. psychoeducation provided. hospitalization to assure safety, stabilize symptoms, optimize treatment, coordinate aftercare services. no CO indicated at this time as patient without plan, able to contract for safety, is in a controlled setting with limited access to lethal means. will place pt on CIWA given recent alcohol use with symptom triggered lorazepam. plan as below     1. Safety: q15 obs    2. Psychiatric:  -  CIWA protocol with symptom triggered lorazepam   - START Zoloft 25mg daily   -PRNs: Olanzapine 5mg  PO or IM for non-redirectable agitation   -Individual, group, milieu therapy  -Psychoeducation provided to patient regarding indication for medications, alternatives, side effects, adherence.    3. Medical:  -PRN: Tylenol 650mg q6-8hr for moderate pain  -Routine labs    4. Disposition: Pending clinical course; coordinate with team social work; pt declines wanting to explore substance use rehab     5. Legal status: 9.13

## 2023-11-21 NOTE — ED ADULT NURSE NOTE - NSFALLUNIVINTERV_ED_ALL_ED
Bed/Stretcher in lowest position, wheels locked, appropriate side rails in place/Call bell, personal items and telephone in reach/Instruct patient to call for assistance before getting out of bed/chair/stretcher/Non-slip footwear applied when patient is off stretcher/[place] to call system/Physically safe environment - no spills, clutter or unnecessary equipment/Purposeful proactive rounding/Room/bathroom lighting operational, light cord in reach

## 2023-11-21 NOTE — ED BEHAVIORAL HEALTH ASSESSMENT NOTE - SUMMARY
the patient is 32 years old single, unemployed, homeless  male, non-caregiver, with no significant PMH, PPHx of self reported depression, HX of polysubstance dependence and one rehab for substance abuse , came to Er c/o feeling depressed and suicidal; not in treatment at present time.  Patient reports that he has been feeling depressed for 2 month and the symptoms are getting worse. He c/o insomnia, poor energy level, hopelessness and poor appetite, he lost "almost 20 lbs within 2 month". He states that he has poor concentration, feels helpless. He has been feeling "I don't want to live anymore, I don't want to wake up" he states that he has been homeless lately, he used to live with his mother, but she couldn't pay the rent and she moved out, he is not employed, he used to work with his father who fired him and since then he is not able to find a job. patient presents with active SI with plan to kill himself by jumping in front of the car.

## 2023-11-21 NOTE — BH PATIENT PROFILE - NSDASAANGER_PSY_ALL_CORE
No Independent sitting, transfers, standing and ambulation with good balance using appropriate assistive device and prevent falls.

## 2023-11-21 NOTE — BH INPATIENT PSYCHIATRY ASSESSMENT NOTE - MSE UNSTRUCTURED FT
Appearance: Dressed appropriately in hospital gown. fair hygiene + grooming   Attitude / Behavior / relatedness: cooperative. calm  Eye contact: fair   Motor: No abnormal movements, no psychomotor slowing or activation.  Speech: Regular rate. soft volume.   Mood: "sad"  Affect: constricted affect.   Thought Process: linear. organized and goal-directed  Thought Content: endorses SI with plan, denies intent. denies HI   Perceptual: denies AVH   Insight: partial   Judgment: fair   Impulse control: fair     Cognition: grossly intact  Gait: Intact.

## 2023-11-21 NOTE — ED ADULT NURSE REASSESSMENT NOTE - NS ED NURSE REASSESS COMMENT FT1
Pt remains awake, a&ox3, sitting in HW chair calm and cooperative with blood draw/ekg. Pending psychiatric evaluation. Will continue to monitor for safety.

## 2023-11-21 NOTE — ED ADULT NURSE NOTE - OBJECTIVE STATEMENT
Pt arrives A&Ox3 and ambulatory. History of ETOH use and rehab, denies any recent use or hospitalization for withdrawal. Pt comes to ED for SI with plan to jump off bridge, denies any prior attempts. States he is stressed because he "is not working and he doesn't think his family is very happy with him". Endorsing auditory hallucinations of "sounds"; denies as commanding. Endorses use of marijuana and cigarettes. Denies VH and HI.

## 2023-11-21 NOTE — BH INPATIENT PSYCHIATRY ASSESSMENT NOTE - DESCRIPTION
single, childless, unemployed, undomiciled. highest level of education was high school degree. 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 not on disability

## 2023-11-21 NOTE — BH INPATIENT PSYCHIATRY ASSESSMENT NOTE - NSBHCHARTREVIEWVS_PSY_A_CORE FT
Vital Signs Last 24 Hrs  T(C): 36.7 (11-21-23 @ 08:10), Max: 37.1 (11-21-23 @ 01:07)  T(F): 98.1 (11-21-23 @ 08:10), Max: 98.7 (11-21-23 @ 01:07)  HR: 74 (11-21-23 @ 08:10) (69 - 103)  BP: 128/84 (11-21-23 @ 08:10) (128/84 - 163/110)  BP(mean): --  RR: 16 (11-21-23 @ 08:10) (16 - 18)  SpO2: 100% (11-21-23 @ 08:10) (99% - 100%)

## 2023-11-21 NOTE — ED ADULT TRIAGE NOTE - CHIEF COMPLAINT QUOTE
Pt c/o psychiatric evaluation. Reports increase in stressors, feeling more depressed and suicidal ideation. Pt w/ plan to "jump off something". Pt also endorsing +AH but unable to elaborate further. Not on any medications. Denies HI, VH. Denies PMHx or psychiatric hx

## 2023-11-21 NOTE — BH INPATIENT PSYCHIATRY ASSESSMENT NOTE - HPI (INCLUDE ILLNESS QUALITY, SEVERITY, DURATION, TIMING, CONTEXT, MODIFYING FACTORS, ASSOCIATED SIGNS AND SYMPTOMS)
33 yo male, single, childless, undomiciled, psychiatric diagnosis of depression, no prior hospitalizations, not currently in treatment, polysubstance use (crack cocaine, heroin, alcohol), denies legal / violence hx, medical diagnosis of HTN not on medications, BIB self and admitted 9.13 due to worsening low mood and SI.     On arrival to , patient reports 1 month worsening low mood in setting of psychosocial stressors. Patient states he is currently homeless and living with friends, has no job and has no financial support aside from inconsistently receiving money from his family. He wants to get his life back together but does not know where to start. He feels his family is hesitant to support him because they feel he will spend their money on drugs. He acknowledges struggling with cocaine use and last used crack cocaine a few days ago. he reports past problems with alcohol and heroin. he reports last alcohol use was two days ago and he denies cravings, sx of withdrawal. Patient reports sleeping poorly, low appetite, trouble concentrating, thoughts of not wanting to live. pt has no intent but has thought of plan including running into traffic or jumping off a bridge. He feels something is wrong with his brain. he denies manic symptoms including decreased need for sleep, high energy. patient reports past occasions of AH and feeling paranoid but states this was always in the context of substance use. he reports 1 past rehab stint but felt it as not helpful. Patient currently denies intent to harm himself on the unit and feels safe. he does not have thoughts of harming others. we discuss Sertraline; rare and common side effects discussed with patient and he is agreeable to trial.     PER ED ASSESSMENT NOTE:   Patient reports that he has been feeling depressed for 2 month and the symptoms are getting worse. He c/o insomnia, poor energy level, hopelessness and poor appetite, he lost "almost 20 lbs within 2 month". He states that he has poor concentration, feels helpless. He has been feeling "I don't want to live, I don't want to wake up" he states that he has been homeless lately, he used to live with his mother, but she couldn't pay the rent and she moved out, he is not employed, he used to work with his father who fired him and since then he is not able to find a job. He states that sometimes he drinks a beer or two, but denies any recent use of cocaine. He states that his depression is getting worse and he has been thinking about jumping in front of a train. No prior SA, but he states that he does not trust himself and he is worried that he might act on it. He has no other complaints. patient denies any psychotic or manic symptoms, no Ah/VH, no delusions. patient admits to paranoid delusions when he uses cocaine, but he denies any recent use. HE denies any manic symptoms at present time and in the past. no diminished need for sleep No goal directed activity, diminished need for sleep, No grandiosity, spending spree.   Patient states that he is not able to function, has poor energy and amotivated, trying to find a job, "but not trying that hard" hopeless and helpless.

## 2023-11-21 NOTE — BH PATIENT PROFILE - FUNCTIONAL ASSESSMENT - BASIC MOBILITY PT AGE POP HIDDEN
SNF Follow-up Note    Skilled Nursing Facility Discharge Assessment 5/30/2019   Acute Facility Discharged From [facility]   Acute Discharge Date 5/28/2019   Name of the Skilled Nursing Facility? [facility] [phone]   Purpose of SNF Admission SN;PT;OT   Estimated length of stay for the patient? Per [facility] d/c date noted.   Who is the insurance provider or payor of patient stay? Medicare   Progression of Patient? Reestablished Care following acute admission.   Skilled Nursing Discharge Date? -   Where was the patient discharged to? -       Chandni Still RN    5/30/2019, 3:12 PM   Adult

## 2023-11-21 NOTE — ED BEHAVIORAL HEALTH ASSESSMENT NOTE - DESCRIPTION
see HPI Vital Signs Last 24 Hrs  T(C): 36.9 (21 Nov 2023 03:45), Max: 37.1 (21 Nov 2023 01:07)  T(F): 98.5 (21 Nov 2023 03:45), Max: 98.7 (21 Nov 2023 01:07)  HR: 69 (21 Nov 2023 03:45) (69 - 103)  BP: 129/89 (21 Nov 2023 03:45) (129/89 - 163/110)  BP(mean): --  RR: 16 (21 Nov 2023 03:45) (16 - 18)  SpO2: 99% (21 Nov 2023 03:45) (99% - 100%)    Parameters below as of 21 Nov 2023 03:45  Patient On (Oxygen Delivery Method): room air denies

## 2023-11-21 NOTE — BH INPATIENT PSYCHIATRY ASSESSMENT NOTE - NSBHMETABOLIC_PSY_ALL_CORE_FT
BMI: BMI (kg/m2): 23.7 (07-31-23 @ 13:24)  HbA1c:   Glucose: POCT Blood Glucose.: 129 mg/dL (07-31-23 @ 13:28)    BP: 128/84 (11-21-23 @ 08:10) (128/84 - 163/110)Vital Signs Last 24 Hrs  T(C): 36.7 (11-21-23 @ 08:10), Max: 37.1 (11-21-23 @ 01:07)  T(F): 98.1 (11-21-23 @ 08:10), Max: 98.7 (11-21-23 @ 01:07)  HR: 74 (11-21-23 @ 08:10) (69 - 103)  BP: 128/84 (11-21-23 @ 08:10) (128/84 - 163/110)  BP(mean): --  RR: 16 (11-21-23 @ 08:10) (16 - 18)  SpO2: 100% (11-21-23 @ 08:10) (99% - 100%)      Lipid Panel:

## 2023-11-21 NOTE — ED ADULT NURSE NOTE - NSFALLRISKFACTORS_ED_ALL_ED
----- Message from Brionna Redd sent at 5/2/2023  3:20 PM CDT -----  e:  RX Refill Request        Who Called: pt        RX Name and Strength: metFORMIN (GLUCOPHAGE) 500 MG tablet        How is the patient currently taking it? (ex. 1XDay): 2XDay  :      Is this a 30 day or 90 day RX: 180        Preferred Pharmacy with phone number: [address] [facility] [address] (Ph: [phone]        Local or Mail Order: local        Ordering Provider: Grace        Would the patient rather a call back or a response via [facility]? call        Best Call Back Number: [phone]        Additional Information:call back     No indicators present

## 2023-11-21 NOTE — BH PATIENT PROFILE - FALL HARM RISK - UNIVERSAL INTERVENTIONS
Bed in lowest position, wheels locked, appropriate side rails in place/Call bell, personal items and telephone in reach/Instruct patient to call for assistance before getting out of bed or chair/Non-slip footwear when patient is out of bed/[place] to call system/Physically safe environment - no spills, clutter or unnecessary equipment/Purposeful Proactive Rounding/Room/bathroom lighting operational, light cord in reach

## 2023-11-21 NOTE — BH INPATIENT PSYCHIATRY ASSESSMENT NOTE - VIOLENCE RISK FACTORS:
Substance abuse/Affective dysregulation/Community stressors that increase the risk of destabilization

## 2023-11-21 NOTE — ED PROVIDER NOTE - CLINICAL SUMMARY MEDICAL DECISION MAKING FREE TEXT BOX
32-year-old male history of anxiety and hypertension that voluntarily reported to the [facility] for depression and suicidal ideation.  Patient has no physical complaints.  On physical exam patient is depressed appearing with low energy and appearing with no eye contact.  Does not appear internally preoccupied at this time.  Denies HI but does admit to SI and hallucinations at times he is hearing voices.  Will consult psych for further recommendations.

## 2023-11-21 NOTE — ED PROVIDER NOTE - OBJECTIVE STATEMENT
32-year-old male with history of anxiety and hypertension no longer taking his medications that presents voluntarily for suicidal ideation and depression.  Patient states for the last month he has been feeling depressed and had significant weight loss because he is not eating due to decreased appetite.  His main concern is having thoughts of hurting himself and having no motivation to do anything.  He is unsure what triggered this situation but feels like he has had it for a long time but never sought help.  Tonight had his friend and brother dropped him off because his situation was worsening.  Is a social drinker and occasional use of marijuana and cocaine but neither tonight.  Denies any homicidal ideations but does report that at times he feels like he is hearing voices but nonspecific.  No physical pain at this time.

## 2023-11-21 NOTE — ED BEHAVIORAL HEALTH ASSESSMENT NOTE - PRIMARY DX
Adjustment disorder with depressed mood Major depressive disorder with current active episode, unspecified depression episode severity, unspecified whether recurrent

## 2023-11-21 NOTE — ED BEHAVIORAL HEALTH ASSESSMENT NOTE - PSYCHIATRIC ISSUES AND PLAN (INCLUDE STANDING AND PRN MEDICATION)
Pt will benefit from SSRI, (preferably trintellix; patient is drinking sometimes  and Lorazepam for w/d Sx , does not need CIWA . PRNs haldol 5 mg  benadryl 50 ativan 2  PO/IM every 8 hrs

## 2023-11-21 NOTE — ED ADULT NURSE REASSESSMENT NOTE - NS ED NURSE REASSESS COMMENT FT1
Break RN: Pt sleeping comfortably in bed, respirations are even and unlabored. NAD, VSS. Pt awaiting bed assignment.

## 2023-11-21 NOTE — ED PROVIDER NOTE - PHYSICAL EXAMINATION
Depressed appearing no eye contact pupils are otherwise equal round and reactive to light.  Gait is steady and stable no gross musculoskeletal deformities.  Heart is regular rate and rhythm lungs are clear to auscultation bilaterally.  Abdomen soft nontender.

## 2023-11-21 NOTE — ED ADULT NURSE NOTE - NS PRO PASSIVE SMOKE EXP
Unknown Post-Care Instructions: Leave bandage in place for two days. Remove and wash area with soap, pat dry, and apply bandage once daily until next visit.

## 2023-11-21 NOTE — ED PROVIDER NOTE - PROGRESS NOTE DETAILS
ADAMS: pt labs are unremarkable. Still pending final psych recs. Will continue to monitor. DD ED ATTG: rec'd s/o from Dr Peace.  32M subst use disorder, depression.  Hearing voices, (+)SI.  MC.  Boarding.  Sleeping comfortably.  Accepted for admission.

## 2023-11-21 NOTE — ED BEHAVIORAL HEALTH ASSESSMENT NOTE - NSBHSAALC_PSY_A_CORE FT
last use 3 days ago as per the pt, 1-2 cans of beer, No HX of w/d seizures, No Hx of DT's or blackouts.

## 2023-11-21 NOTE — ED BEHAVIORAL HEALTH ASSESSMENT NOTE - RISK ASSESSMENT
patient with active SI and plan, impulsive ; drinks alcohol sometimes, Homeless, unemployed, single, poor support. Patient wants to be admitted, will admit on voluntary bases

## 2023-11-21 NOTE — ED BEHAVIORAL HEALTH ASSESSMENT NOTE - HPI (INCLUDE ILLNESS QUALITY, SEVERITY, DURATION, TIMING, CONTEXT, MODIFYING FACTORS, ASSOCIATED SIGNS AND SYMPTOMS)
the patient is 32 years old single, unemployed, homeless  male, non-caregiver, with no significant PMH, PPHx of self reported depression, HX of polysubstance dependence came to Er c/o feeling depressed and suicidal.   Patient reports that he has been feeling depressed for 2 month and the symptoms are getting worse. He c/o insomnia, poor energy level, hopelessness and poor appetite, he lost "almost 20 lbs within 2 month". He states that he has poor concentration, feels helpless. He has been feeling "I don't want to live, I don't want to wake up" he states that he has been homeless lately, he used to live with his mother, but she couldn't pay the rent and she moved out, he is not employed, he used to work with his father who fired him and since then he is not able to find a job. He states pia daley he uses the patient is 32 years old single, unemployed, homeless  male, non-caregiver, with no significant PMH, PPHx of self reported depression, HX of polysubstance dependence and one rehab for substance abuse , came to Er c/o feeling depressed and suicidal; not in treatment at present time.  Patient reports that he has been feeling depressed for 2 month and the symptoms are getting worse. He c/o insomnia, poor energy level, hopelessness and poor appetite, he lost "almost 20 lbs within 2 month". He states that he has poor concentration, feels helpless. He has been feeling "I don't want to live, I don't want to wake up" he states that he has been homeless lately, he used to live with his mother, but she couldn't pay the rent and she moved out, he is not employed, he used to work with his father who fired him and since then he is not able to find a job. He states that sometimes he drinks a beer or two, but denies any recent use of cocaine. He states that his depression is getting worse and he has been thinking about jumping in front of a train. No prior SA, but he states that he does not trust himself and he is worried that he might act on it. He has no other complaints. patient denies any psychotic or manic symptoms, no Ah/VH, no delusions. patient admits to paranoid delusions when he uses cocaine, but he denies any recent use. HE denies any manic symptoms at present time and in the past. no diminished need for sleep No goal directed activity, diminished need for sleep, No grandiosity, spending spree.   Patient states that he is not able to function, has poor energy and amotivated, trying to find a job, "but not trying that hard" hopeless and helpless.

## 2023-11-21 NOTE — ED BEHAVIORAL HEALTH NOTE - BEHAVIORAL HEALTH NOTE
Chart Reviewed.     Writer outreached by  provider requesting collateral.     On 3 attempts to pt's mother Chart Reviewed.     Writer outreached by  provider requesting collateral.     On 3 attempts to pt's mother: Karina Mark [phone] call unsuccessful. Writer LVM with best contact information for mother's opportunity to place call back.   Writer additionally attempted # found in EMR: [phone], however # disconnected.   Writer lastly attempted alternate number noted in EMR: [phone] however straight to VM of pt.     Hernán attempted to outreach  provider for update however unsuccessful.

## 2023-11-21 NOTE — BH INPATIENT PSYCHIATRY ASSESSMENT NOTE - RISK ASSESSMENT
Risk factors for self-harm / harm to others: sex, psychiatric diagnosis, substance use hx   Protective factors: inpatient and in a controlled setting with limited access to lethal means. not endorsing harm to self or others, able to contract for safety.

## 2023-11-22 PROCEDURE — 99232 SBSQ HOSP IP/OBS MODERATE 35: CPT

## 2023-11-22 RX ADMIN — Medication 100 MILLIGRAM(S): at 09:24

## 2023-11-22 RX ADMIN — Medication 1 PATCH: at 19:51

## 2023-11-22 RX ADMIN — SERTRALINE 25 MILLIGRAM(S): 25 TABLET, FILM COATED ORAL at 09:24

## 2023-11-22 RX ADMIN — Medication 1 PATCH: at 09:25

## 2023-11-22 RX ADMIN — Medication 5 MILLIGRAM(S): at 00:02

## 2023-11-22 RX ADMIN — Medication 1 MILLIGRAM(S): at 09:24

## 2023-11-22 RX ADMIN — Medication 4 MILLIGRAM(S): at 16:22

## 2023-11-22 RX ADMIN — Medication 1 TABLET(S): at 09:24

## 2023-11-22 NOTE — PSYCHIATRIC REHAB INITIAL EVALUATION - NSBHPRRECOMMEND_PSY_ALL_CORE
Writer met with patient in order to orient patient to unit, provide patient with a unit schedule, and introduce patient to psychiatric rehabilitation staff and department functions. Writer discussed current protocol in response to COVID-19; writer reviewed the importance of daily hygiene and hand-washing. Patient was oriented to safety planning protocol and was informed that safety planning will be an integral aspect of their care during their inpatient hospitalization. Patient was verbal, polite and cooperative. Patient self-presented to ED due to worsening mood and SI. Patient reports history of depression and polysubstance use (reports currently using crack and cocaine). Patient has no prior psychiatric hospitalization but does have one prior inpatient rehab treatment. Patient has no history of SA. Patient is not currently in treatment. Writer and patient were able to collaborate on a psychiatric rehabilitation goal.  Psych rehab staff will continue to engage patient daily in order to build therapeutic rapport.

## 2023-11-22 NOTE — BH PSYCHOLOGY - CLINICIAN PSYCHOTHERAPY NOTE - NSBHPSYCHOLNARRATIVE_PSY_A_CORE FT
Writer met with Pt for 20-minute initial individual therapy session. Wr reviewed limits of confidentiality. Pt was alert and presented with adequate hygiene and grooming. Pt shared feeling "calm", affect full and congruent. Pt denied experiencing any A/V hallucinations. His thought process was linear and goal directed. Pts’ speech was WNL, content was relevant to discussion. Pt denied passive or active SI, HI or NSSI during time of session but endorsed feeling suicidal a few days ago which prompted his brother to bring him to the ER. Oriented x3. Limited insight and judgement demonstrated.     Session focused on reviewing pts mental health and substance use hx, family and social supports, current symptoms as well as providing space for pt to share thoughts and feelings. Pt shared that he began using substances (cannabis, alcohol) at the age of 12, then began using cocaine, heroin and crack in his twenties, and has struggled with using substances since. Pt endorsed history of depressive symptoms and using substances to numb/avoid. Introduced ACT concepts such as avoidance, and explored with pt events leading up to hospitalization. Pt shared growing up with both parents (who  when he was 16) as well as two sisters and one brother. Shared that he has a "good relationship" with family, who he believes would support him financially if tied to his recovery. Pt discussed pyschosocial stressors such as being homeless, and shared that him and his brother are both homeless and use drugs together. Shared that he went to inpatient rehab, following sober housing in august/september but relapsed shortly after. Explored past and current trigger with pt sharing that mental health, boredom and lack of support contributed to him using. Stated that he did not like rehab due to them treating him like a child, but that he felt motivated to engage in treatment and would like to explore different options. Explored short term goals (engaging in tx, exploring aftercare, re engaging with his mother and father for support) and encouraged pt to attend groups. Writer met with Pt for 20-minute initial individual therapy session. Wr reviewed limits of confidentiality. Pt was alert and presented with adequate hygiene and grooming. Pt shared feeling "calm", affect full and congruent. Pt denied experiencing any A/V hallucinations. His thought process was linear and goal directed. Pts’ speech was WNL, content was relevant to discussion. Pt denied passive or active SI, HI or NSSI during time of session but endorsed feeling suicidal a few days ago which prompted his brother to bring him to the ER. Oriented x3. Limited insight and judgement demonstrated.     Session focused on reviewing pts mental health and substance use hx, family and social supports, current symptoms as well as providing space for pt to share thoughts and feelings. Pt shared that he began using substances (cannabis, alcohol) at the age of 12, then began using cocaine, heroin and crack in his twenties, and has struggled with using substances since. Pt endorsed history of depressive symptoms and using substances to numb/avoid. Introduced ACT concepts such as avoidance, and explored with pt events leading up to hospitalization. Pt shared growing up with both parents (who  when he was 16) as well as two sisters and one brother. Shared that he has a "good relationship" with family, who he believes would support him financially if tied to his recovery. Pt discussed psychosocial stressors such as being homeless, and shared that he and his brother are both homeless and use drugs together. Shared that he went to inpatient rehab, following sober housing in august/september but relapsed shortly after. Explored past and current trigger with pt sharing that mental health, boredom and lack of support contributed to him using. Stated that he did not like rehab due to them treating him like a child, but that he felt motivated to engage in treatment and would like to explore different options. Explored short term goals (engaging in tx, exploring aftercare, re engaging with his mother and father for support) and encouraged pt to attend groups.

## 2023-11-22 NOTE — BH SOCIAL WORK INITIAL PSYCHOSOCIAL EVALUATION - OTHER PAST PSYCHIATRIC HISTORY (INCLUDE DETAILS REGARDING ONSET, COURSE OF ILLNESS, INPATIENT/OUTPATIENT TREATMENT)
According to  Behavioral health assessment, the patient is 32 years old single, unemployed, homeless  male, non-caregiver, with no significant PMH, PPHx of self reported depression, HX of polysubstance dependence and one rehab for substance abuse , came to Er c/o feeling depressed and suicidal; not in treatment at present time.  Patient reports that he has been feeling depressed for 2 month and the symptoms are getting worse. He c/o insomnia, poor energy level, hopelessness and poor appetite, he lost "almost 20 lbs within 2 month". He states that he has poor concentration, feels helpless. He has been feeling "I don't want to live, I don't want to wake up" he states that he has been homeless lately, he used to live with his mother, but she couldn't pay the rent and she moved out, he is not employed, he used to work with his father who fired him and since then he is not able to find a job. He states that sometimes he drinks a beer or two, but denies any recent use of cocaine. He states that his depression is getting worse and he has been thinking about jumping in front of a train. No prior SA, but he states that he does not trust himself and he is worried that he might act on it. He has no other complaints. patient denies any psychotic or manic symptoms, no Ah/VH, no delusions. patient admits to paranoid delusions when he uses cocaine, but he denies any recent use. HE denies any manic symptoms at present time and in the past. no diminished need for sleep No goal directed activity, diminished need for sleep, No grandiosity, spending spree. "

## 2023-11-22 NOTE — BH SOCIAL WORK INITIAL PSYCHOSOCIAL EVALUATION - DETAILS
Pt believes that his brother has either schizophrenia or a personality disorder. His grandmother has OCD and mother has anxiety.

## 2023-11-23 PROCEDURE — 99231 SBSQ HOSP IP/OBS SF/LOW 25: CPT

## 2023-11-23 RX ORDER — TRAZODONE HCL 50 MG
50 TABLET ORAL AT BEDTIME
Refills: 0 | Status: DISCONTINUED | OUTPATIENT
Start: 2023-11-23 | End: 2023-11-25

## 2023-11-23 RX ORDER — SERTRALINE 25 MG/1
50 TABLET, FILM COATED ORAL DAILY
Refills: 0 | Status: DISCONTINUED | OUTPATIENT
Start: 2023-11-24 | End: 2023-11-30

## 2023-11-23 RX ADMIN — Medication 100 MILLIGRAM(S): at 08:32

## 2023-11-23 RX ADMIN — OLANZAPINE 5 MILLIGRAM(S): 15 TABLET, FILM COATED ORAL at 15:48

## 2023-11-23 RX ADMIN — Medication 1 PATCH: at 08:53

## 2023-11-23 RX ADMIN — Medication 1 TABLET(S): at 08:32

## 2023-11-23 RX ADMIN — Medication 5 MILLIGRAM(S): at 21:05

## 2023-11-23 RX ADMIN — Medication 1 MILLIGRAM(S): at 08:32

## 2023-11-23 RX ADMIN — SERTRALINE 25 MILLIGRAM(S): 25 TABLET, FILM COATED ORAL at 08:32

## 2023-11-24 PROCEDURE — 99232 SBSQ HOSP IP/OBS MODERATE 35: CPT

## 2023-11-24 RX ORDER — BUPROPION HYDROCHLORIDE 150 MG/1
150 TABLET, EXTENDED RELEASE ORAL DAILY
Refills: 0 | Status: DISCONTINUED | OUTPATIENT
Start: 2023-11-25 | End: 2023-11-30

## 2023-11-24 RX ADMIN — Medication 1 TABLET(S): at 08:56

## 2023-11-24 RX ADMIN — Medication 100 MILLIGRAM(S): at 08:56

## 2023-11-24 RX ADMIN — Medication 50 MILLIGRAM(S): at 22:30

## 2023-11-24 RX ADMIN — Medication 1 MILLIGRAM(S): at 08:56

## 2023-11-24 RX ADMIN — SERTRALINE 50 MILLIGRAM(S): 25 TABLET, FILM COATED ORAL at 08:56

## 2023-11-25 PROCEDURE — 99231 SBSQ HOSP IP/OBS SF/LOW 25: CPT

## 2023-11-25 RX ORDER — HYDROXYZINE HCL 10 MG
50 TABLET ORAL ONCE
Refills: 0 | Status: COMPLETED | OUTPATIENT
Start: 2023-11-25 | End: 2023-11-25

## 2023-11-25 RX ORDER — TRAZODONE HCL 50 MG
100 TABLET ORAL AT BEDTIME
Refills: 0 | Status: DISCONTINUED | OUTPATIENT
Start: 2023-11-25 | End: 2023-11-30

## 2023-11-25 RX ADMIN — SERTRALINE 50 MILLIGRAM(S): 25 TABLET, FILM COATED ORAL at 08:45

## 2023-11-25 RX ADMIN — Medication 1 TABLET(S): at 08:44

## 2023-11-25 RX ADMIN — Medication 100 MILLIGRAM(S): at 21:32

## 2023-11-25 RX ADMIN — BUPROPION HYDROCHLORIDE 150 MILLIGRAM(S): 150 TABLET, EXTENDED RELEASE ORAL at 08:44

## 2023-11-25 RX ADMIN — Medication 1 PATCH: at 19:28

## 2023-11-25 RX ADMIN — Medication 5 MILLIGRAM(S): at 21:32

## 2023-11-25 RX ADMIN — Medication 1 MILLIGRAM(S): at 08:44

## 2023-11-25 RX ADMIN — Medication 50 MILLIGRAM(S): at 15:49

## 2023-11-25 RX ADMIN — Medication 1 PATCH: at 08:45

## 2023-11-26 RX ORDER — DIPHENHYDRAMINE HCL 50 MG
50 CAPSULE ORAL ONCE
Refills: 0 | Status: COMPLETED | OUTPATIENT
Start: 2023-11-26 | End: 2023-11-26

## 2023-11-26 RX ADMIN — Medication 50 MILLIGRAM(S): at 22:05

## 2023-11-26 RX ADMIN — Medication 1 TABLET(S): at 09:09

## 2023-11-26 RX ADMIN — Medication 100 MILLIGRAM(S): at 20:05

## 2023-11-26 RX ADMIN — Medication 1 PATCH: at 08:46

## 2023-11-26 RX ADMIN — BUPROPION HYDROCHLORIDE 150 MILLIGRAM(S): 150 TABLET, EXTENDED RELEASE ORAL at 08:45

## 2023-11-26 RX ADMIN — SERTRALINE 50 MILLIGRAM(S): 25 TABLET, FILM COATED ORAL at 08:46

## 2023-11-26 RX ADMIN — Medication 1 MILLIGRAM(S): at 08:45

## 2023-11-27 PROCEDURE — 99231 SBSQ HOSP IP/OBS SF/LOW 25: CPT

## 2023-11-27 RX ORDER — HYDROXYZINE HCL 10 MG
25 TABLET ORAL EVERY 6 HOURS
Refills: 0 | Status: DISCONTINUED | OUTPATIENT
Start: 2023-11-27 | End: 2023-11-27

## 2023-11-27 RX ORDER — HYDROXYZINE HCL 10 MG
25 TABLET ORAL EVERY 6 HOURS
Refills: 0 | Status: DISCONTINUED | OUTPATIENT
Start: 2023-11-27 | End: 2023-11-30

## 2023-11-27 RX ORDER — SERTRALINE 25 MG/1
1 TABLET, FILM COATED ORAL
Qty: 14 | Refills: 0
Start: 2023-11-27 | End: 2023-12-10

## 2023-11-27 RX ORDER — BUPROPION HYDROCHLORIDE 150 MG/1
1 TABLET, EXTENDED RELEASE ORAL
Qty: 14 | Refills: 0
Start: 2023-11-27 | End: 2023-12-10

## 2023-11-27 RX ADMIN — Medication 100 MILLIGRAM(S): at 21:30

## 2023-11-27 RX ADMIN — BUPROPION HYDROCHLORIDE 150 MILLIGRAM(S): 150 TABLET, EXTENDED RELEASE ORAL at 08:16

## 2023-11-27 RX ADMIN — Medication 25 MILLIGRAM(S): at 18:40

## 2023-11-27 RX ADMIN — SERTRALINE 50 MILLIGRAM(S): 25 TABLET, FILM COATED ORAL at 08:16

## 2023-11-27 RX ADMIN — Medication 1 MILLIGRAM(S): at 08:15

## 2023-11-27 RX ADMIN — Medication 1 PATCH: at 09:29

## 2023-11-27 RX ADMIN — Medication 1 TABLET(S): at 08:16

## 2023-11-27 NOTE — BH SAFETY PLAN - CRISIS CLINICIAN NAME 1
Patient had recently been scheduled for an EGD and a colonoscpy She had to be rescheduled as her SBP was 210 She visited her PCP and was found to have normal BP see Transitions of Care attached

## 2023-11-28 PROCEDURE — 99231 SBSQ HOSP IP/OBS SF/LOW 25: CPT

## 2023-11-28 RX ADMIN — Medication 5 MILLIGRAM(S): at 20:39

## 2023-11-28 RX ADMIN — Medication 1 PATCH: at 09:22

## 2023-11-28 RX ADMIN — Medication 25 MILLIGRAM(S): at 15:11

## 2023-11-28 RX ADMIN — Medication 1 PATCH: at 19:37

## 2023-11-28 RX ADMIN — SERTRALINE 50 MILLIGRAM(S): 25 TABLET, FILM COATED ORAL at 09:22

## 2023-11-28 RX ADMIN — Medication 25 MILLIGRAM(S): at 20:39

## 2023-11-28 RX ADMIN — Medication 1 MILLIGRAM(S): at 09:22

## 2023-11-28 RX ADMIN — BUPROPION HYDROCHLORIDE 150 MILLIGRAM(S): 150 TABLET, EXTENDED RELEASE ORAL at 09:23

## 2023-11-28 NOTE — BH DISCHARGE NOTE NURSING/SOCIAL WORK/PSYCH REHAB - NSDCADDINFO1FT_PSY_ALL_CORE
Upon successful completion of your appointment at the Crisis Clinic on 12/4/23 at 10:30AM, you will be scheduled for an intake appointment at [facility]. This is a Crisis First appointment. Upon successful completion of your appointment at the Crisis Clinic on 12/4/23 at 10:30AM, you will be scheduled for an intake appointment at [facility].

## 2023-11-28 NOTE — BH INPATIENT PSYCHIATRY DISCHARGE NOTE - HPI (INCLUDE ILLNESS QUALITY, SEVERITY, DURATION, TIMING, CONTEXT, MODIFYING FACTORS, ASSOCIATED SIGNS AND SYMPTOMS)
31 yo male, single, childless, undomiciled, psychiatric diagnosis of depression, no prior hospitalizations, not currently in treatment, polysubstance use (crack cocaine, heroin, alcohol), denies legal / violence hx, medical diagnosis of HTN not on medications, BIB self and admitted 9.13 due to worsening low mood and SI.     On arrival to , patient reports 1 month worsening low mood in setting of psychosocial stressors. Patient states he is currently homeless and living with friends, has no job and has no financial support aside from inconsistently receiving money from his family. He wants to get his life back together but does not know where to start. He feels his family is hesitant to support him because they feel he will spend their money on drugs. He acknowledges struggling with cocaine use and last used crack cocaine a few days ago. he reports past problems with alcohol and heroin. he reports last alcohol use was two days ago and he denies cravings, sx of withdrawal. Patient reports sleeping poorly, low appetite, trouble concentrating, thoughts of not wanting to live. pt has no intent but has thought of plan including running into traffic or jumping off a bridge. He feels something is wrong with his brain. he denies manic symptoms including decreased need for sleep, high energy. patient reports past occasions of AH and feeling paranoid but states this was always in the context of substance use. he reports 1 past rehab stint but felt it as not helpful. Patient currently denies intent to harm himself on the unit and feels safe. he does not have thoughts of harming others. we discuss Sertraline; rare and common side effects discussed with patient and he is agreeable to trial.     PER ED ASSESSMENT NOTE:   Patient reports that he has been feeling depressed for 2 month and the symptoms are getting worse. He c/o insomnia, poor energy level, hopelessness and poor appetite, he lost "almost 20 lbs within 2 month". He states that he has poor concentration, feels helpless. He has been feeling "I don't want to live, I don't want to wake up" he states that he has been homeless lately, he used to live with his mother, but she couldn't pay the rent and she moved out, he is not employed, he used to work with his father who fired him and since then he is not able to find a job. He states that sometimes he drinks a beer or two, but denies any recent use of cocaine. He states that his depression is getting worse and he has been thinking about jumping in front of a train. No prior SA, but he states that he does not trust himself and he is worried that he might act on it. He has no other complaints. patient denies any psychotic or manic symptoms, no Ah/VH, no delusions. patient admits to paranoid delusions when he uses cocaine, but he denies any recent use. HE denies any manic symptoms at present time and in the past. no diminished need for sleep No goal directed activity, diminished need for sleep, No grandiosity, spending spree.   Patient states that he is not able to function, has poor energy and amotivated, trying to find a job, "but not trying that hard" hopeless and helpless.

## 2023-11-28 NOTE — BH INPATIENT PSYCHIATRY DISCHARGE NOTE - DESCRIPTION
single, childless, unemployed, undomiciled. highest level of education was high school degree. 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 not on disability

## 2023-11-28 NOTE — BH DISCHARGE NOTE NURSING/SOCIAL WORK/PSYCH REHAB - DISCHARGE INSTRUCTIONS AFTERCARE APPOINTMENTS
In order to check the location, date, or time of your aftercare appointment, please refer to your Discharge Instructions Document given to you upon leaving the hospital.  If you have lost the instructions please call [phone]

## 2023-11-28 NOTE — BH DISCHARGE NOTE NURSING/SOCIAL WORK/PSYCH REHAB - NSDCPRGOAL_PSY_ALL_CORE
During hospitalization, patient was intermittently active in the milieu and attended some groups. In groups patient was meaningfully engaged and was observed to be appropriately engaged with his peers. Patient maintained good behavioral control on the unit and was compliant with treatment. Patient independently maintained ADLs, and was observed to be kempt & dressed casually throughout hospitalization. Patient endorses significant improvement in mood and sleep. Patient denies SI/HI/AVH. Patient reports that after discharge he'd like to get a job and remains hopeful for his recovery. Patient was compliant in safety planning and was provided with a [employer] survey. By the end of hospitalization, patient did meet his goal of identifying 2 coping skills that assist in improving mood. Patient has also demonstrated better insight and judgement, as well as future-oriented thinking.

## 2023-11-28 NOTE — BH DISCHARGE NOTE NURSING/SOCIAL WORK/PSYCH REHAB - NSBHDCADDR1FT_A_CORE
[address] [place] [address], [facility], First Floor, [place] [address], [facility]

## 2023-11-28 NOTE — BH DISCHARGE NOTE NURSING/SOCIAL WORK/PSYCH REHAB - NSDCPRRECOMMEND_PSY_ALL_CORE
Psych rehab recommends that patient maintain medication compliance and utilize identified coping skills to manage symptoms. Psych rehab also recommends that patient follow outpatient treatment as prescribed for ongoing medication management, support and psychotherapy.

## 2023-11-28 NOTE — BH DISCHARGE NOTE NURSING/SOCIAL WORK/PSYCH REHAB - PATIENT PORTAL LINK FT
You can access the FollowMyHealth Patient Portal offered by [facility] by registering at the following website: [email or web address]. By joining [employer]’s FollowMyHealth portal, you will also be able to view your health information using other applications (apps) compatible with our system.

## 2023-11-28 NOTE — BH DISCHARGE NOTE NURSING/SOCIAL WORK/PSYCH REHAB - NSCDUDCCRISIS_PSY_A_CORE
.National Suicide Prevention Lifeline 6 (351) 420-7605/.  [employer]  1 (573) LIFENET (245-5363)/.  [facility]  [address] [place] [address] [phone]   Hours:  Monday through Friday from 9 AM to 3 PM/988 Suicide and Crisis Lifeline

## 2023-11-29 PROCEDURE — 99232 SBSQ HOSP IP/OBS MODERATE 35: CPT

## 2023-11-29 RX ADMIN — Medication 1 PATCH: at 07:23

## 2023-11-29 RX ADMIN — Medication 25 MILLIGRAM(S): at 11:52

## 2023-11-29 RX ADMIN — BUPROPION HYDROCHLORIDE 150 MILLIGRAM(S): 150 TABLET, EXTENDED RELEASE ORAL at 08:09

## 2023-11-29 RX ADMIN — SERTRALINE 50 MILLIGRAM(S): 25 TABLET, FILM COATED ORAL at 08:10

## 2023-11-29 RX ADMIN — Medication 25 MILLIGRAM(S): at 19:58

## 2023-11-29 RX ADMIN — Medication 5 MILLIGRAM(S): at 21:45

## 2023-11-29 RX ADMIN — Medication 1 PATCH: at 08:10

## 2023-11-29 RX ADMIN — Medication 100 MILLIGRAM(S): at 21:45

## 2023-11-29 RX ADMIN — Medication 1 PATCH: at 19:56

## 2023-11-29 NOTE — BH PSYCHOLOGY - CLINICIAN PSYCHOTHERAPY NOTE - NSBHPSYCHOLINT_PSY_A_CORE
Cognitive/behavioral therapy/Dialectical  Behavioral Therapy (DBT)/Dynamic issues addressed/Reality testing/Supported coping skills/Supportive therapy/Treatment compliance encouraged
Cognitive/behavioral therapy/Dialectical  Behavioral Therapy (DBT)/Reality testing/Stress management/Supported coping skills/Treatment compliance encouraged/other...

## 2023-11-29 NOTE — BH INPATIENT PSYCHIATRY PROGRESS NOTE - NSBHCHARTREVIEWVS_PSY_A_CORE FT
Vital Signs Last 24 Hrs  T(C): 36.7 (11-22-23 @ 08:30), Max: 36.7 (11-21-23 @ 15:00)  T(F): 98 (11-22-23 @ 08:30), Max: 98.1 (11-21-23 @ 15:00)  HR: 89 (11-21-23 @ 15:00) (89 - 89)  BP: --  BP(mean): --  RR: 18 (11-21-23 @ 15:00) (18 - 18)  SpO2: --    Orthostatic VS  11-22-23 @ 08:30  Lying BP: --/-- HR: --  Sitting BP: 123/80 HR: 81  Standing BP: 121/89 HR: 77  Site: --  Mode: --  Orthostatic VS  11-21-23 @ 15:00  Lying BP: --/-- HR: --  Sitting BP: 127/91 HR: 84  Standing BP: 124/77 HR: 89  Site: --  Mode: --  
Vital Signs Last 24 Hrs  T(C): 36.7 (11-23-23 @ 08:13), Max: 36.9 (11-22-23 @ 20:58)  T(F): 98.1 (11-23-23 @ 08:13), Max: 98.4 (11-22-23 @ 20:58)  HR: --  BP: --  BP(mean): --  RR: --  SpO2: --    Orthostatic VS  11-23-23 @ 08:13  Lying BP: --/-- HR: --  Sitting BP: 126/80 HR: 66  Standing BP: 128/81 HR: 70  Site: --  Mode: --  Orthostatic VS  11-22-23 @ 20:58  Lying BP: --/-- HR: --  Sitting BP: 126/88 HR: 69  Standing BP: 135/89 HR: 81  Site: --  Mode: --  Orthostatic VS  11-22-23 @ 08:30  Lying BP: --/-- HR: --  Sitting BP: 123/80 HR: 81  Standing BP: 121/89 HR: 77  Site: --  Mode: --  
Vital Signs Last 24 Hrs  T(C): 36.4 (11-29-23 @ 08:25), Max: 36.4 (11-29-23 @ 08:25)  T(F): 97.6 (11-29-23 @ 08:25), Max: 97.6 (11-29-23 @ 08:25)  HR: --  BP: --  BP(mean): --  RR: --  SpO2: --    Orthostatic VS  11-29-23 @ 08:25  Lying BP: --/-- HR: --  Sitting BP: 141/80 HR: 97  Standing BP: 145/78 HR: 80  Site: --  Mode: --  Orthostatic VS  11-28-23 @ 08:46  Lying BP: --/-- HR: --  Sitting BP: 132/97 HR: 71  Standing BP: 137/95 HR: 81  Site: upper right arm  Mode: electronic  
Vital Signs Last 24 Hrs  T(C): 36.7 (11-25-23 @ 08:16), Max: 36.9 (11-24-23 @ 21:03)  T(F): 98.1 (11-25-23 @ 08:16), Max: 98.4 (11-24-23 @ 21:03)  HR: --  BP: --  BP(mean): --  RR: --  SpO2: --    Orthostatic VS  11-25-23 @ 08:16  Lying BP: --/-- HR: --  Sitting BP: 119/85 HR: 77  Standing BP: 138/90 HR: 93  Site: --  Mode: --  Orthostatic VS  11-24-23 @ 21:03  Lying BP: --/-- HR: --  Sitting BP: 128/80 HR: 103  Standing BP: 124/81 HR: 104  Site: --  Mode: --  Orthostatic VS  11-24-23 @ 08:05  Lying BP: --/-- HR: --  Sitting BP: 143/93 HR: 61  Standing BP: 134/82 HR: 74  Site: --  Mode: --  Orthostatic VS  11-23-23 @ 21:24  Lying BP: --/-- HR: --  Sitting BP: 139/90 HR: 89  Standing BP: 130/97 HR: 101  Site: --  Mode: --  
Vital Signs Last 24 Hrs  T(C): --  T(F): --  HR: --  BP: --  BP(mean): --  RR: --  SpO2: --    Orthostatic VS  11-26-23 @ 09:05  Lying BP: --/-- HR: --  Sitting BP: 140/88 HR: 71  Standing BP: 133/90 HR: 80  Site: --  Mode: --  
Vital Signs Last 24 Hrs  T(C): 36.2 (11-24-23 @ 08:05), Max: 36.7 (11-23-23 @ 21:24)  T(F): 97.2 (11-24-23 @ 08:05), Max: 98 (11-23-23 @ 21:24)  HR: --  BP: --  BP(mean): --  RR: --  SpO2: --    Orthostatic VS  11-24-23 @ 08:05  Lying BP: --/-- HR: --  Sitting BP: 143/93 HR: 61  Standing BP: 134/82 HR: 74  Site: --  Mode: --  Orthostatic VS  11-23-23 @ 21:24  Lying BP: --/-- HR: --  Sitting BP: 139/90 HR: 89  Standing BP: 130/97 HR: 101  Site: --  Mode: --  Orthostatic VS  11-23-23 @ 08:13  Lying BP: --/-- HR: --  Sitting BP: 126/80 HR: 66  Standing BP: 128/81 HR: 70  Site: --  Mode: --  Orthostatic VS  11-22-23 @ 20:58  Lying BP: --/-- HR: --  Sitting BP: 126/88 HR: 69  Standing BP: 135/89 HR: 81  Site: --  Mode: --  
Vital Signs Last 24 Hrs  T(C): 36.3 (11-28-23 @ 08:46), Max: 36.3 (11-28-23 @ 08:46)  T(F): 97.4 (11-28-23 @ 08:46), Max: 97.4 (11-28-23 @ 08:46)  HR: --  BP: --  BP(mean): --  RR: --  SpO2: --    Orthostatic VS  11-28-23 @ 08:46  Lying BP: --/-- HR: --  Sitting BP: 132/97 HR: 71  Standing BP: 137/95 HR: 81  Site: upper right arm  Mode: electronic

## 2023-11-29 NOTE — BH INPATIENT PSYCHIATRY PROGRESS NOTE - NSBHMETABOLIC_PSY_ALL_CORE_FT
BMI: BMI (kg/m2): 23.7 (07-31-23 @ 13:24)  HbA1c:   Glucose: POCT Blood Glucose.: 129 mg/dL (07-31-23 @ 13:28)    BP: --Vital Signs Last 24 Hrs  T(C): 36.3 (11-28-23 @ 08:46), Max: 36.3 (11-28-23 @ 08:46)  T(F): 97.4 (11-28-23 @ 08:46), Max: 97.4 (11-28-23 @ 08:46)  HR: --  BP: --  BP(mean): --  RR: --  SpO2: --    Orthostatic VS  11-28-23 @ 08:46  Lying BP: --/-- HR: --  Sitting BP: 132/97 HR: 71  Standing BP: 137/95 HR: 81  Site: upper right arm  Mode: electronic    Lipid Panel: 
BMI: BMI (kg/m2): 23.7 (07-31-23 @ 13:24)  HbA1c:   Glucose: POCT Blood Glucose.: 129 mg/dL (07-31-23 @ 13:28)    BP: 128/84 (11-21-23 @ 08:10) (128/84 - 163/110)Vital Signs Last 24 Hrs  T(C): 36.7 (11-22-23 @ 08:30), Max: 36.7 (11-21-23 @ 15:00)  T(F): 98 (11-22-23 @ 08:30), Max: 98.1 (11-21-23 @ 15:00)  HR: 89 (11-21-23 @ 15:00) (89 - 89)  BP: --  BP(mean): --  RR: 18 (11-21-23 @ 15:00) (18 - 18)  SpO2: --    Orthostatic VS  11-22-23 @ 08:30  Lying BP: --/-- HR: --  Sitting BP: 123/80 HR: 81  Standing BP: 121/89 HR: 77  Site: --  Mode: --  Orthostatic VS  11-21-23 @ 15:00  Lying BP: --/-- HR: --  Sitting BP: 127/91 HR: 84  Standing BP: 124/77 HR: 89  Site: --  Mode: --    Lipid Panel: 
BMI: BMI (kg/m2): 23.7 (07-31-23 @ 13:24)  HbA1c:   Glucose: POCT Blood Glucose.: 129 mg/dL (07-31-23 @ 13:28)    BP: 128/84 (11-21-23 @ 08:10) (128/84 - 163/110)Vital Signs Last 24 Hrs  T(C): 36.7 (11-23-23 @ 08:13), Max: 36.9 (11-22-23 @ 20:58)  T(F): 98.1 (11-23-23 @ 08:13), Max: 98.4 (11-22-23 @ 20:58)  HR: --  BP: --  BP(mean): --  RR: --  SpO2: --    Orthostatic VS  11-23-23 @ 08:13  Lying BP: --/-- HR: --  Sitting BP: 126/80 HR: 66  Standing BP: 128/81 HR: 70  Site: --  Mode: --  Orthostatic VS  11-22-23 @ 20:58  Lying BP: --/-- HR: --  Sitting BP: 126/88 HR: 69  Standing BP: 135/89 HR: 81  Site: --  Mode: --  Orthostatic VS  11-22-23 @ 08:30  Lying BP: --/-- HR: --  Sitting BP: 123/80 HR: 81  Standing BP: 121/89 HR: 77  Site: --  Mode: --    Lipid Panel: 
BMI: BMI (kg/m2): 23.7 (07-31-23 @ 13:24)  HbA1c:   Glucose: POCT Blood Glucose.: 129 mg/dL (07-31-23 @ 13:28)    BP: --Vital Signs Last 24 Hrs  T(C): 36.4 (11-29-23 @ 08:25), Max: 36.4 (11-29-23 @ 08:25)  T(F): 97.6 (11-29-23 @ 08:25), Max: 97.6 (11-29-23 @ 08:25)  HR: --  BP: --  BP(mean): --  RR: --  SpO2: --    Orthostatic VS  11-29-23 @ 08:25  Lying BP: --/-- HR: --  Sitting BP: 141/80 HR: 97  Standing BP: 145/78 HR: 80  Site: --  Mode: --  Orthostatic VS  11-28-23 @ 08:46  Lying BP: --/-- HR: --  Sitting BP: 132/97 HR: 71  Standing BP: 137/95 HR: 81  Site: upper right arm  Mode: electronic    Lipid Panel: 
BMI: BMI (kg/m2): 23.7 (07-31-23 @ 13:24)  HbA1c:   Glucose: POCT Blood Glucose.: 129 mg/dL (07-31-23 @ 13:28)    BP: --Vital Signs Last 24 Hrs  T(C): --  T(F): --  HR: --  BP: --  BP(mean): --  RR: --  SpO2: --    Orthostatic VS  11-26-23 @ 09:05  Lying BP: --/-- HR: --  Sitting BP: 140/88 HR: 71  Standing BP: 133/90 HR: 80  Site: --  Mode: --    Lipid Panel: 
BMI: BMI (kg/m2): 23.7 (07-31-23 @ 13:24)  HbA1c:   Glucose: POCT Blood Glucose.: 129 mg/dL (07-31-23 @ 13:28)    BP: --Vital Signs Last 24 Hrs  T(C): 36.2 (11-24-23 @ 08:05), Max: 36.7 (11-23-23 @ 21:24)  T(F): 97.2 (11-24-23 @ 08:05), Max: 98 (11-23-23 @ 21:24)  HR: --  BP: --  BP(mean): --  RR: --  SpO2: --    Orthostatic VS  11-24-23 @ 08:05  Lying BP: --/-- HR: --  Sitting BP: 143/93 HR: 61  Standing BP: 134/82 HR: 74  Site: --  Mode: --  Orthostatic VS  11-23-23 @ 21:24  Lying BP: --/-- HR: --  Sitting BP: 139/90 HR: 89  Standing BP: 130/97 HR: 101  Site: --  Mode: --  Orthostatic VS  11-23-23 @ 08:13  Lying BP: --/-- HR: --  Sitting BP: 126/80 HR: 66  Standing BP: 128/81 HR: 70  Site: --  Mode: --  Orthostatic VS  11-22-23 @ 20:58  Lying BP: --/-- HR: --  Sitting BP: 126/88 HR: 69  Standing BP: 135/89 HR: 81  Site: --  Mode: --    Lipid Panel: 
BMI: BMI (kg/m2): 23.7 (07-31-23 @ 13:24)  HbA1c:   Glucose: POCT Blood Glucose.: 129 mg/dL (07-31-23 @ 13:28)    BP: --Vital Signs Last 24 Hrs  T(C): 36.7 (11-25-23 @ 08:16), Max: 36.9 (11-24-23 @ 21:03)  T(F): 98.1 (11-25-23 @ 08:16), Max: 98.4 (11-24-23 @ 21:03)  HR: --  BP: --  BP(mean): --  RR: --  SpO2: --    Orthostatic VS  11-25-23 @ 08:16  Lying BP: --/-- HR: --  Sitting BP: 119/85 HR: 77  Standing BP: 138/90 HR: 93  Site: --  Mode: --  Orthostatic VS  11-24-23 @ 21:03  Lying BP: --/-- HR: --  Sitting BP: 128/80 HR: 103  Standing BP: 124/81 HR: 104  Site: --  Mode: --  Orthostatic VS  11-24-23 @ 08:05  Lying BP: --/-- HR: --  Sitting BP: 143/93 HR: 61  Standing BP: 134/82 HR: 74  Site: --  Mode: --  Orthostatic VS  11-23-23 @ 21:24  Lying BP: --/-- HR: --  Sitting BP: 139/90 HR: 89  Standing BP: 130/97 HR: 101  Site: --  Mode: --    Lipid Panel:

## 2023-11-29 NOTE — BH PSYCHOLOGY - CLINICIAN PSYCHOTHERAPY NOTE - TOKEN PULL-DIAGNOSIS
Primary Diagnosis:  Major depression [F32.9]        Problem Dx:   Alcohol use [Z78.9]      Cocaine use disorder [F14.10]      Major depressive disorder with current active episode, unspecified depression episode severity, unspecified whether recurrent [F32.9]      Cocaine abuse in remission [F14.11]      Alcohol abuse [F10.10]      Adjustment disorder with depressed mood [F43.21]      
Primary Diagnosis:  Major depression [F32.9]        Problem Dx:   Alcohol use [Z78.9]      Cocaine use disorder [F14.10]      Major depressive disorder with current active episode, unspecified depression episode severity, unspecified whether recurrent [F32.9]      Cocaine abuse in remission [F14.11]      Alcohol abuse [F10.10]      Adjustment disorder with depressed mood [F43.21]

## 2023-11-29 NOTE — BH INPATIENT PSYCHIATRY PROGRESS NOTE - NSICDXBHPRIMARYDX_PSY_ALL_CORE
Major depression   F32.9  

## 2023-11-29 NOTE — BH INPATIENT PSYCHIATRY PROGRESS NOTE - PRN MEDS
MEDICATIONS  (PRN):  acetaminophen     Tablet .. 650 milliGRAM(s) Oral every 6 hours PRN Mild Pain (1 - 3), Moderate Pain (4 - 6)  LORazepam     Tablet 2 milliGRAM(s) Oral every 2 hours PRN CIWA score increase by 2 points and current CIWA score GREATER THAN 9  melatonin. 5 milliGRAM(s) Oral at bedtime PRN Insomnia  nicotine  Polacrilex Gum 4 milliGRAM(s) Oral every 2 hours PRN nrt  OLANZapine Disintegrating Tablet 5 milliGRAM(s) Oral every 6 hours PRN agitation  OLANZapine Injectable 5 milliGRAM(s) IntraMuscular once PRN severe psychotic agitation  
MEDICATIONS  (PRN):  acetaminophen     Tablet .. 650 milliGRAM(s) Oral every 6 hours PRN Mild Pain (1 - 3), Moderate Pain (4 - 6)  melatonin. 5 milliGRAM(s) Oral at bedtime PRN Insomnia  nicotine  Polacrilex Gum 4 milliGRAM(s) Oral every 2 hours PRN nrt  OLANZapine Disintegrating Tablet 5 milliGRAM(s) Oral every 6 hours PRN agitation  OLANZapine Injectable 5 milliGRAM(s) IntraMuscular once PRN severe psychotic agitation  traZODone 50 milliGRAM(s) Oral at bedtime PRN insomnia  
MEDICATIONS  (PRN):  acetaminophen     Tablet .. 650 milliGRAM(s) Oral every 6 hours PRN Mild Pain (1 - 3), Moderate Pain (4 - 6)  melatonin. 5 milliGRAM(s) Oral at bedtime PRN Insomnia  nicotine  Polacrilex Gum 4 milliGRAM(s) Oral every 2 hours PRN nrt  OLANZapine Disintegrating Tablet 5 milliGRAM(s) Oral every 6 hours PRN agitation  OLANZapine Injectable 5 milliGRAM(s) IntraMuscular once PRN severe psychotic agitation  
MEDICATIONS  (PRN):  acetaminophen     Tablet .. 650 milliGRAM(s) Oral every 6 hours PRN Mild Pain (1 - 3), Moderate Pain (4 - 6)  melatonin. 5 milliGRAM(s) Oral at bedtime PRN Insomnia  nicotine  Polacrilex Gum 4 milliGRAM(s) Oral every 2 hours PRN nrt  OLANZapine Disintegrating Tablet 5 milliGRAM(s) Oral every 6 hours PRN agitation  OLANZapine Injectable 5 milliGRAM(s) IntraMuscular once PRN severe psychotic agitation  traZODone 50 milliGRAM(s) Oral at bedtime PRN insomnia  
MEDICATIONS  (PRN):  acetaminophen     Tablet .. 650 milliGRAM(s) Oral every 6 hours PRN Mild Pain (1 - 3), Moderate Pain (4 - 6)  hydrOXYzine hydrochloride 25 milliGRAM(s) Oral every 6 hours PRN anxiety  melatonin. 5 milliGRAM(s) Oral at bedtime PRN Insomnia  nicotine  Polacrilex Gum 4 milliGRAM(s) Oral every 2 hours PRN nrt  OLANZapine Disintegrating Tablet 5 milliGRAM(s) Oral every 6 hours PRN agitation  OLANZapine Injectable 5 milliGRAM(s) IntraMuscular once PRN severe psychotic agitation  
MEDICATIONS  (PRN):  acetaminophen     Tablet .. 650 milliGRAM(s) Oral every 6 hours PRN Mild Pain (1 - 3), Moderate Pain (4 - 6)  hydrOXYzine hydrochloride 25 milliGRAM(s) Oral every 6 hours PRN anxiety  melatonin. 5 milliGRAM(s) Oral at bedtime PRN Insomnia  nicotine  Polacrilex Gum 4 milliGRAM(s) Oral every 2 hours PRN nrt  OLANZapine Disintegrating Tablet 5 milliGRAM(s) Oral every 6 hours PRN agitation  OLANZapine Injectable 5 milliGRAM(s) IntraMuscular once PRN severe psychotic agitation  
MEDICATIONS  (PRN):  acetaminophen     Tablet .. 650 milliGRAM(s) Oral every 6 hours PRN Mild Pain (1 - 3), Moderate Pain (4 - 6)  LORazepam     Tablet 2 milliGRAM(s) Oral every 2 hours PRN CIWA score increase by 2 points and current CIWA score GREATER THAN 9  melatonin. 5 milliGRAM(s) Oral at bedtime PRN Insomnia  nicotine  Polacrilex Gum 4 milliGRAM(s) Oral every 2 hours PRN nrt  OLANZapine Disintegrating Tablet 5 milliGRAM(s) Oral every 6 hours PRN agitation  OLANZapine Injectable 5 milliGRAM(s) IntraMuscular once PRN severe psychotic agitation  traZODone 50 milliGRAM(s) Oral at bedtime PRN insomnia

## 2023-11-29 NOTE — BH PSYCHOLOGY - CLINICIAN PSYCHOTHERAPY NOTE - NSBHPSYCHOLNARRATIVE_PSY_A_CORE FT
Writer met with Pt for 20-minute individual therapy session. Pt was alert and presented with adequate hygiene and grooming. Pt reported "feeling good", affect full and congruent. Pt denied experiencing any A/V hallucinations. His thought process was linear and goal directed. Pts’ speech was WNL, content was relevant to discussion. Pt denied passive or active SI, HI or NSSI. Oriented x3. Fair insight and judgement demonstrated.    Session focused on pts upcoming discharge, reviewing tx goals and aftercare. Discussed with pt common substance use triggers (being bored, inability to tolerate uncomfortable emotions, drinking alcohol which also leads to using other substances, as well as socializing with certain people), normalized and explored coping skills (drawing, music, distraction, avoiding alcohol) and substance use treatment. Pt shared preferring NA over AA, plans to attend outpatient substance use tx. Pt shared that he believes he can "hang out" with certain people and not use. Did psychoed on triggers, substance use recovery. Pt and Wr discussed urge surfing worksheet. Pt talked about the relationship with his family, as well as brothers mental health and substance use issues. Pt shared "it felt like a wake up call" to be hospitalized, feeling more hopeful about recovery. Reflected on what pt has learned in tx, and instilled hope and resilience for the future.

## 2023-11-29 NOTE — BH INPATIENT PSYCHIATRY PROGRESS NOTE - NSTXCOPEGOAL_PSY_ALL_CORE
Identify and utilize 2 coping skills that meet their needs
spouse

## 2023-11-29 NOTE — BH INPATIENT PSYCHIATRY PROGRESS NOTE - NSICDXBHSECONDARYDX_PSY_ALL_CORE
Adjustment disorder with depressed mood   F43.21  Alcohol abuse   F10.10  Cocaine abuse in remission   F14.11  

## 2023-11-29 NOTE — BH PSYCHOLOGY - CLINICIAN PSYCHOTHERAPY NOTE - NSBHPSYCHOLPROBS_PSY_ALL_CORE
Academic/Vocational/Social Dysfunction/Depression/Substance Abuse/Suicidality/Other...
Depression/Substance Abuse/Other...

## 2023-11-29 NOTE — BH INPATIENT PSYCHIATRY PROGRESS NOTE - CURRENT MEDICATION
MEDICATIONS  (STANDING):  buPROPion XL (24-Hour) 150 milliGRAM(s) Oral daily  nicotine - 21 mG/24Hr(s) Patch 1 Patch Transdermal once  nicotine - 21 mG/24Hr(s) Patch 1 Patch Transdermal daily  sertraline 50 milliGRAM(s) Oral daily  traZODone 100 milliGRAM(s) Oral at bedtime    MEDICATIONS  (PRN):  acetaminophen     Tablet .. 650 milliGRAM(s) Oral every 6 hours PRN Mild Pain (1 - 3), Moderate Pain (4 - 6)  hydrOXYzine hydrochloride 25 milliGRAM(s) Oral every 6 hours PRN anxiety  melatonin. 5 milliGRAM(s) Oral at bedtime PRN Insomnia  nicotine  Polacrilex Gum 4 milliGRAM(s) Oral every 2 hours PRN nrt  OLANZapine Disintegrating Tablet 5 milliGRAM(s) Oral every 6 hours PRN agitation  OLANZapine Injectable 5 milliGRAM(s) IntraMuscular once PRN severe psychotic agitation  
MEDICATIONS  (STANDING):  folic acid 1 milliGRAM(s) Oral daily  multivitamin 1 Tablet(s) Oral daily  nicotine - 21 mG/24Hr(s) Patch 1 Patch Transdermal once  nicotine - 21 mG/24Hr(s) Patch 1 Patch Transdermal daily  sertraline 25 milliGRAM(s) Oral daily  thiamine 100 milliGRAM(s) Oral daily    MEDICATIONS  (PRN):  acetaminophen     Tablet .. 650 milliGRAM(s) Oral every 6 hours PRN Mild Pain (1 - 3), Moderate Pain (4 - 6)  LORazepam     Tablet 2 milliGRAM(s) Oral every 2 hours PRN CIWA score increase by 2 points and current CIWA score GREATER THAN 9  melatonin. 5 milliGRAM(s) Oral at bedtime PRN Insomnia  nicotine  Polacrilex Gum 4 milliGRAM(s) Oral every 2 hours PRN nrt  OLANZapine Disintegrating Tablet 5 milliGRAM(s) Oral every 6 hours PRN agitation  OLANZapine Injectable 5 milliGRAM(s) IntraMuscular once PRN severe psychotic agitation  
MEDICATIONS  (STANDING):  buPROPion XL (24-Hour) 150 milliGRAM(s) Oral daily  folic acid 1 milliGRAM(s) Oral daily  multivitamin 1 Tablet(s) Oral daily  nicotine - 21 mG/24Hr(s) Patch 1 Patch Transdermal once  nicotine - 21 mG/24Hr(s) Patch 1 Patch Transdermal daily  sertraline 50 milliGRAM(s) Oral daily  traZODone 100 milliGRAM(s) Oral at bedtime    MEDICATIONS  (PRN):  acetaminophen     Tablet .. 650 milliGRAM(s) Oral every 6 hours PRN Mild Pain (1 - 3), Moderate Pain (4 - 6)  melatonin. 5 milliGRAM(s) Oral at bedtime PRN Insomnia  nicotine  Polacrilex Gum 4 milliGRAM(s) Oral every 2 hours PRN nrt  OLANZapine Disintegrating Tablet 5 milliGRAM(s) Oral every 6 hours PRN agitation  OLANZapine Injectable 5 milliGRAM(s) IntraMuscular once PRN severe psychotic agitation  
MEDICATIONS  (STANDING):  buPROPion XL (24-Hour) 150 milliGRAM(s) Oral daily  nicotine - 21 mG/24Hr(s) Patch 1 Patch Transdermal once  nicotine - 21 mG/24Hr(s) Patch 1 Patch Transdermal daily  sertraline 50 milliGRAM(s) Oral daily  traZODone 100 milliGRAM(s) Oral at bedtime    MEDICATIONS  (PRN):  acetaminophen     Tablet .. 650 milliGRAM(s) Oral every 6 hours PRN Mild Pain (1 - 3), Moderate Pain (4 - 6)  hydrOXYzine hydrochloride 25 milliGRAM(s) Oral every 6 hours PRN anxiety  melatonin. 5 milliGRAM(s) Oral at bedtime PRN Insomnia  nicotine  Polacrilex Gum 4 milliGRAM(s) Oral every 2 hours PRN nrt  OLANZapine Disintegrating Tablet 5 milliGRAM(s) Oral every 6 hours PRN agitation  OLANZapine Injectable 5 milliGRAM(s) IntraMuscular once PRN severe psychotic agitation  
MEDICATIONS  (STANDING):  folic acid 1 milliGRAM(s) Oral daily  multivitamin 1 Tablet(s) Oral daily  nicotine - 21 mG/24Hr(s) Patch 1 Patch Transdermal once  nicotine - 21 mG/24Hr(s) Patch 1 Patch Transdermal daily  thiamine 100 milliGRAM(s) Oral daily    MEDICATIONS  (PRN):  acetaminophen     Tablet .. 650 milliGRAM(s) Oral every 6 hours PRN Mild Pain (1 - 3), Moderate Pain (4 - 6)  LORazepam     Tablet 2 milliGRAM(s) Oral every 2 hours PRN CIWA score increase by 2 points and current CIWA score GREATER THAN 9  melatonin. 5 milliGRAM(s) Oral at bedtime PRN Insomnia  nicotine  Polacrilex Gum 4 milliGRAM(s) Oral every 2 hours PRN nrt  OLANZapine Disintegrating Tablet 5 milliGRAM(s) Oral every 6 hours PRN agitation  OLANZapine Injectable 5 milliGRAM(s) IntraMuscular once PRN severe psychotic agitation  traZODone 50 milliGRAM(s) Oral at bedtime PRN insomnia  
MEDICATIONS  (STANDING):  folic acid 1 milliGRAM(s) Oral daily  multivitamin 1 Tablet(s) Oral daily  nicotine - 21 mG/24Hr(s) Patch 1 Patch Transdermal once  nicotine - 21 mG/24Hr(s) Patch 1 Patch Transdermal daily  sertraline 50 milliGRAM(s) Oral daily    MEDICATIONS  (PRN):  acetaminophen     Tablet .. 650 milliGRAM(s) Oral every 6 hours PRN Mild Pain (1 - 3), Moderate Pain (4 - 6)  melatonin. 5 milliGRAM(s) Oral at bedtime PRN Insomnia  nicotine  Polacrilex Gum 4 milliGRAM(s) Oral every 2 hours PRN nrt  OLANZapine Disintegrating Tablet 5 milliGRAM(s) Oral every 6 hours PRN agitation  OLANZapine Injectable 5 milliGRAM(s) IntraMuscular once PRN severe psychotic agitation  traZODone 50 milliGRAM(s) Oral at bedtime PRN insomnia  
MEDICATIONS  (STANDING):  buPROPion XL (24-Hour) 150 milliGRAM(s) Oral daily  folic acid 1 milliGRAM(s) Oral daily  multivitamin 1 Tablet(s) Oral daily  nicotine - 21 mG/24Hr(s) Patch 1 Patch Transdermal once  nicotine - 21 mG/24Hr(s) Patch 1 Patch Transdermal daily  sertraline 50 milliGRAM(s) Oral daily    MEDICATIONS  (PRN):  acetaminophen     Tablet .. 650 milliGRAM(s) Oral every 6 hours PRN Mild Pain (1 - 3), Moderate Pain (4 - 6)  melatonin. 5 milliGRAM(s) Oral at bedtime PRN Insomnia  nicotine  Polacrilex Gum 4 milliGRAM(s) Oral every 2 hours PRN nrt  OLANZapine Disintegrating Tablet 5 milliGRAM(s) Oral every 6 hours PRN agitation  OLANZapine Injectable 5 milliGRAM(s) IntraMuscular once PRN severe psychotic agitation  traZODone 50 milliGRAM(s) Oral at bedtime PRN insomnia

## 2023-11-29 NOTE — BH PSYCHOLOGY - CLINICIAN PSYCHOTHERAPY NOTE - NSBHPSYCHOLGOALS_PSY_A_CORE
Decrease symptoms/Assessment/Improve level of independent functioning/Improve social/vocational/coping skills/Prevent relapse/Psychoeducation/Treatment compliance
Abstinence/Decrease symptoms/Assessment/Improve level of independent functioning/Improve social/vocational/coping skills/Psychoeducation/Treatment compliance

## 2023-11-29 NOTE — BH INPATIENT PSYCHIATRY PROGRESS NOTE - NSBHASSESSSUMMFT_PSY_ALL_CORE
33 yo male, single, childless, unemployed, undomiciled, psychiatric diagnosis of depression, no prior hospitalizations, not currently in treatment, polysubstance use (crack cocaine, heroin, alcohol), denies legal / violence hx, medical diagnosis of HTN not on medications, BIB self and admitted 9.13 due to worsening low mood and SI.    Emotionally regulated, in good behavioral control, continue meds as ordered.  Tentative discharge tomorrow on 3DL, SW team arranging for aftercare, pt continuing to refuse substance use treatment referrals.  
31 yo male, single, childless, unemployed, undomiciled, psychiatric diagnosis of depression, no prior hospitalizations, not currently in treatment, polysubstance use (crack cocaine, heroin, alcohol), denies legal / violence hx, medical diagnosis of HTN not on medications, BIB self and admitted 9.13 due to worsening low mood and SI.    tolerating Sertraline. ongoing depressive symptoms with passive thoughts of not wanting to be alive without intent or plan. he is interested in Wellbutrin and cites past positive experiences. will start Wellbutrin for tomorrow AM.     1. Safety: q15 obs    2. Psychiatric:  -  Zoloft 50mg daily   - Wellbutrin XL 150mg daily   -PRNs: Haldol 5mg + Lorazepam 2mg +/- Benadryl PO or IM for non-redirectable agitation   -Individual, group, milieu therapy  -Psychoeducation provided to patient regarding indication for medications, alternatives, side effects, adherence.    3. Medical:  -PRN: Tylenol 650mg q6-8hr for moderate pain  -Routine labs    4. Disposition: Pending clinical course; coordinate with team social work. pt declines inpatient substance use rehab     5. Legal status: 9.13
33 yo male, single, childless, unemployed, undomiciled, psychiatric diagnosis of depression, no prior hospitalizations, not currently in treatment, polysubstance use (crack cocaine, heroin, alcohol), denies legal / violence hx, medical diagnosis of HTN not on medications, BIB self and admitted 9.13 due to worsening low mood and SI.    The patient is accommodating to the unit well, tolerating Zoloft well thus far, will continue.    Continue Zoloft 25mg daily, with plan to titrate.
Continue below plan as per primary treatment team. Will increase zoloft starting tomorrow morning and add trazodone PRN tonight.     33 yo male, single, childless, unemployed, undomiciled, psychiatric diagnosis of depression, no prior hospitalizations, not currently in treatment, polysubstance use (crack cocaine, heroin, alcohol), denies legal / violence hx, medical diagnosis of HTN not on medications, BIB self and admitted 9.13 due to worsening low mood and SI.    The patient is accommodating to the unit well, tolerating Zoloft well thus far, will continue.    Continue Zoloft 25mg daily, with plan to titrate.
  31 yo male, single, childless, unemployed, undomiciled, psychiatric diagnosis of depression, no prior hospitalizations, not currently in treatment, polysubstance use (crack cocaine, heroin, alcohol), denies legal / violence hx, medical diagnosis of HTN not on medications, BIB self and admitted 9.13 due to worsening low mood and SI.    improving mood. no SI. pt future oriented. requesting to be discharged and submitted 3-day letter. plan as below     1. Safety: q15 obs    2. Psychiatric:  -  Zoloft 50mg daily   - Wellbutrin XL 150mg daily   -PRNs: Haldol 5mg + Lorazepam 2mg +/- Benadryl PO or IM for non-redirectable agitation   -Individual, group, milieu therapy  -Psychoeducation provided to patient regarding indication for medications, alternatives, side effects, adherence.    3. Medical:  -PRN: Tylenol 650mg q6-8hr for moderate pain  -Routine labs    4. Disposition: Pending clinical course; coordinate with team social work. pt declines inpatient substance use rehab     5. Legal status: 9.13
Continue the below plan as per primary treatment team:    31 yo male, single, childless, unemployed, undomiciled, psychiatric diagnosis of depression, no prior hospitalizations, not currently in treatment, polysubstance use (crack cocaine, heroin, alcohol), denies legal / violence hx, medical diagnosis of HTN not on medications, BIB self and admitted 9.13 due to worsening low mood and SI.    tolerating Sertraline. ongoing depressive symptoms with passive thoughts of not wanting to be alive without intent or plan. he is interested in Wellbutrin and cites past positive experiences. will start Wellbutrin for tomorrow AM.     1. Safety: q15 obs    2. Psychiatric:  -  Zoloft 50mg daily   - Wellbutrin XL 150mg daily   -PRNs: Haldol 5mg + Lorazepam 2mg +/- Benadryl PO or IM for non-redirectable agitation   -Individual, group, milieu therapy  -Psychoeducation provided to patient regarding indication for medications, alternatives, side effects, adherence.    3. Medical:  -PRN: Tylenol 650mg q6-8hr for moderate pain  -Routine labs    4. Disposition: Pending clinical course; coordinate with team social work. pt declines inpatient substance use rehab     5. Legal status: 9.13
  31 yo male, single, childless, unemployed, undomiciled, psychiatric diagnosis of depression, no prior hospitalizations, not currently in treatment, polysubstance use (crack cocaine, heroin, alcohol), denies legal / violence hx, medical diagnosis of HTN not on medications, BIB self and admitted 9.13 due to worsening low mood and SI.    ongoing mood improvements. appropriately behaved on the unit. inquires about outpatient treatment but continues to want to go home and submitted 3-day letter yesterday. coordinating with s/w regarding aftercare planning     1. Safety: q15 obs    2. Psychiatric:  -  Zoloft 50mg daily   - Wellbutrin XL 150mg daily   -PRNs: Haldol 5mg + Lorazepam 2mg +/- Benadryl PO or IM for non-redirectable agitation   -Individual, group, milieu therapy  -Psychoeducation provided to patient regarding indication for medications, alternatives, side effects, adherence.    3. Medical:  -PRN: Tylenol 650mg q6-8hr for moderate pain  -Routine labs    4. Disposition: Pending clinical course; coordinate with team social work. pt declines inpatient substance use rehab     5. Legal status: 9.13

## 2023-11-29 NOTE — BH INPATIENT PSYCHIATRY PROGRESS NOTE - NSBHFUPINTERVALCCFT_PSY_A_CORE
follow up mood 
Depression
follow up mood 
Depression
follow up mood

## 2023-11-29 NOTE — BH INPATIENT PSYCHIATRY PROGRESS NOTE - NSBHCONSULTIPREASON_PSY_A_CORE
Erroneous entry  
danger to self; mental illness expected to respond to inpatient care
other reason
danger to self; mental illness expected to respond to inpatient care
danger to self; mental illness expected to respond to inpatient care

## 2023-11-29 NOTE — BH INPATIENT PSYCHIATRY PROGRESS NOTE - NSBHFUPINTERVALHXFT_PSY_A_CORE
Patient seen for follow up of depression, chart reviewed, and case discussed with treatment team.  No events reported overnight.  The patient states he is feeling a little better being in the hospital, finds it to be a safe and supportive environment.  He admits to some SI yesterday, but no intent or plan.  Behavior has been well controlled.  He reports eating well, sleeping well.  He has been visible on the unit, social with peers, and attending groups.  He has been compliant with medications, tolerating them well thus far.
No overnight events.  Pt today states that he is feeling well, sleeping and eating adequately.  He acknowledges recent substance use, but he states that psychiatric hospitalization was much more helpful than inpatient rehabs in the past, still does not want to attend latter, only wants psychiatric follow up and not substance use treatment.
Chart reviewed including pertinent labs, imaging, ekg. case discussed with treatment team.  Over this interval: patient continues to feel better. he feels the combination of medications + absence of substances has allowed for his improved sx. he acknowledges challenges with substances and inquires about substance related treatment but declines inpatient rehab. he continues to wish to go home and prioritizes this over securing aftercare. he firmly denies SI. no psychotic or manic sx. 
Chart reviewed including pertinent labs, imaging, ekg. case discussed with nursing team.  Pt says that he's doing well today. He slept well but feels tired today. He ate breakfast and denies any problems or concerns. He denies any problems with the meds. 
Chart reviewed including pertinent labs, imaging, ekg. case discussed with treatment team.  Over this interval: no behavioral issues. pt feels his mood is much better, attributes it to medications and being away from substances. he declines inpatient substance use rehab. he denies SI. he states he has reconnected with his family who will allow him to stay with them. he is requesting to be discharged and submitted 3-day letter requesting to leave. pt without aftercare at this time 
Patient seen for follow up of depression, chart reviewed, and case discussed with nursing team.  No events reported overnight.   Pt is found still resting in bed and states that he only slept 3 hours. He says that the melatonin isn't cutting it for sleep. He denies any problems with the meds. No SI. He asks for wellbutrin for his mood, says he took it in the past with good effect, but he's o.k. with waiting to discuss with his primary treatment team tomorrow. 
Chart reviewed including pertinent labs, imaging, ekg. case discussed with treatment team.  Over this interval: patient continues to feel depressed, feels passive thoughts of not wanting to be alive are still there but no intent or plan at this time. he inquires about sleep and states he only received melatonin last night. He is informed he also has prn Trazodone. he inquires about Wellbutrin and relates he had taken it in the past and found it to be helpful. psychoeducation provided to pt. he denies any recent or past sx consistent with tennille. no AVH at this time

## 2023-11-29 NOTE — BH INPATIENT PSYCHIATRY PROGRESS NOTE - NSBHATTESTBILLING_PSY_A_CORE
33642-Rtxqpktcqg OBS or IP - low complexity OR 25-34 mins
62421-Nmcuwwdmvc OBS or IP - moderate complexity OR 35-49 mins
95122-Kjrbwpagtf OBS or IP - moderate complexity OR 35-49 mins
30777-Rvnsfqseim OBS or IP - low complexity OR 25-34 mins
54959-Llrfxntpza OBS or IP - low complexity OR 25-34 mins
60557-Rubhitlnvn OBS or IP - low complexity OR 25-34 mins
53840-Hswshsfcyg OBS or IP - moderate complexity OR 35-49 mins

## 2023-11-29 NOTE — BH INPATIENT PSYCHIATRY PROGRESS NOTE - MSE UNSTRUCTURED FT
Appearance: Dressed appropriately in hospital gown. appropriate hygiene + grooming   Attitude / Behavior / relatedness: cooperative. calm  Eye contact: fair   Motor: No abnormal movements, no psychomotor slowing or activation.  Speech: Regular rate. soft volume.   Mood: "fine"   Affect: neutral. brighter   Thought Process: linear. organized and goal-directed  Thought Content: denies SI. +Future oriented.   Perceptual: denies AVH   Insight: improving   Judgment: improving   Impulse control:good   Cognition: grossly intact  Gait: Intact. 
Appearance: Dressed appropriately in hospital gown. fair hygiene + grooming   Attitude / Behavior / relatedness: cooperative. calm  Eye contact: fair   Motor: No abnormal movements, no psychomotor slowing or activation.  Speech: Regular rate. soft volume.   Mood: "fine"   Affect: constricted   Thought Process: linear. organized and goal-directed  Thought Content: endorses intermittent SI, but no active ideation, intent or plan in the hospital.  Denies HI   Perceptual: denies AVH   Insight: partial   Judgment: fair   Impulse control: fair     Cognition: grossly intact  Gait: Intact. 
Appearance: Dressed appropriately.  Good hygiene/grooming.   Behavior: Cooperative.  Calm.  Well related.  Good eye contact.   Motor: No abnormal movements, no psychomotor slowing or activation.  Speech: Regular rate.  Mood: "Good."  Affect: Pleasant.   Thought Process: Linear.  Associations: Fair.  Thought Content: Denies AVH.  Denies SIIP or HIIP.  Insight: Limited.  Judgment: Fair on interview.  Attention: Fair.  Language: Fluent.  Gait: Intact. 
Appearance: Dressed appropriately in hospital gown. fair hygiene + grooming   Attitude / Behavior / relatedness: cooperative. calm  Eye contact: fair   Motor: No abnormal movements, no psychomotor slowing or activation.  Speech: Regular rate. soft volume.   Mood: "better here"  Affect: constricted affect.   Thought Process: linear. organized and goal-directed  Thought Content: endorses intermittent SI, but no active ideation, intent or plan in the hospital.  Denies HI   Perceptual: denies AVH   Insight: partial   Judgment: fair   Impulse control: fair     Cognition: grossly intact  Gait: Intact. 
Appearance: Dressed appropriately in casual clothing. appropriate hygiene + grooming   Attitude / Behavior / relatedness: cooperative. calm  Eye contact: fair   Motor: No abnormal movements, no psychomotor slowing or activation.  Speech: Regular rate. soft volume.   Mood: "fine"   Affect: neutral. reactive.   Thought Process: linear. organized and goal-directed  Thought Content: denies SI. +Future oriented.   Perceptual: denies AVH   Insight: improving   Judgment: improving   Impulse control: good   Cognition: grossly intact  Gait: Intact. 
Appearance: Dressed appropriately in hospital gown. fair hygiene + grooming   Attitude / Behavior / relatedness: cooperative. calm  Eye contact: fair   Motor: No abnormal movements, no psychomotor slowing or activation.  Speech: Regular rate. soft volume.   Mood: "fine"   Affect: constricted   Thought Process: linear. organized and goal-directed  Thought Content: endorses intermittent SI, but no active ideation, intent or plan in the hospital.  Denies HI   Perceptual: denies AVH   Insight: partial   Judgment: fair   Impulse control: fair     Cognition: grossly intact  Gait: Intact. 
Appearance: Dressed appropriately in hospital gown. fair hygiene + grooming   Attitude / Behavior / relatedness: cooperative. calm  Eye contact: fair   Motor: No abnormal movements, no psychomotor slowing or activation.  Speech: Regular rate. soft volume.   Mood: "o.k."  Affect: constricted   Thought Process: linear. organized and goal-directed  Thought Content: endorses intermittent SI, but no active ideation, intent or plan in the hospital.  Denies HI   Perceptual: denies AVH   Insight: partial   Judgment: fair   Impulse control: fair     Cognition: grossly intact  Gait: Intact.

## 2023-11-30 VITALS — TEMPERATURE: 98 F

## 2023-11-30 RX ADMIN — SERTRALINE 50 MILLIGRAM(S): 25 TABLET, FILM COATED ORAL at 08:50

## 2023-11-30 RX ADMIN — BUPROPION HYDROCHLORIDE 150 MILLIGRAM(S): 150 TABLET, EXTENDED RELEASE ORAL at 08:50

## 2023-11-30 RX ADMIN — Medication 1 PATCH: at 08:49

## 2023-11-30 RX ADMIN — Medication 25 MILLIGRAM(S): at 12:48

## 2023-11-30 NOTE — BH INPATIENT PSYCHIATRY DISCHARGE NOTE - NSBHMETABOLIC_PSY_ALL_CORE_FT
BMI: BMI (kg/m2): 23.7 (07-31-23 @ 13:24)  HbA1c:   Glucose: POCT Blood Glucose.: 129 mg/dL (07-31-23 @ 13:28)    BP: --Vital Signs Last 24 Hrs  T(C): 36.7 (11-30-23 @ 07:39), Max: 36.7 (11-30-23 @ 07:39)  T(F): 98 (11-30-23 @ 07:39), Max: 98 (11-30-23 @ 07:39)  HR: --  BP: --  BP(mean): --  RR: --  SpO2: --    Orthostatic VS  11-30-23 @ 07:39  Lying BP: --/-- HR: --  Sitting BP: 105/71 HR: 86  Standing BP: 101/69 HR: 85  Site: --  Mode: --  Orthostatic VS  11-29-23 @ 08:25  Lying BP: --/-- HR: --  Sitting BP: 141/80 HR: 97  Standing BP: 145/78 HR: 80  Site: --  Mode: --    Lipid Panel: 
BMI: BMI (kg/m2): 23.7 (07-31-23 @ 13:24)  HbA1c:   Glucose: POCT Blood Glucose.: 129 mg/dL (07-31-23 @ 13:28)    BP: --Vital Signs Last 24 Hrs  T(C): 36.3 (11-28-23 @ 08:46), Max: 36.3 (11-28-23 @ 08:46)  T(F): 97.4 (11-28-23 @ 08:46), Max: 97.4 (11-28-23 @ 08:46)  HR: --  BP: --  BP(mean): --  RR: --  SpO2: --    Orthostatic VS  11-28-23 @ 08:46  Lying BP: --/-- HR: --  Sitting BP: 132/97 HR: 71  Standing BP: 137/95 HR: 81  Site: upper right arm  Mode: electronic    Lipid Panel:

## 2023-11-30 NOTE — BH INPATIENT PSYCHIATRY DISCHARGE NOTE - NSBHSACOC_PSY_A_CORE FT
Pt reports he last used crack and cocaine two days prior.  Pt reported he last used crack and cocaine two days prior to admission.

## 2023-11-30 NOTE — BH INPATIENT PSYCHIATRY DISCHARGE NOTE - NSBHASSESSSUMMFT_PSY_ALL_CORE
Attenuated presentation, pt is future oriented, without SIIP or HIIP.    Risk assessment on discharge: Pt has chronic risk factors of hx depression, substance use disorders, chronic medical problems, family hx of SMI, unemployment, with acute risk factors of recent depression and suicidality in context of substance use, now treated with inpatient care consisting of medication management and psychotherapeutic interventions.  Protective factors of supportive family, stable housing, future orientation, therapeutic alliances.  Pt submitted 3 day letter.   Pt has consistently denied suicidality and homicidality, is emotionally regulated and in good behavioral control, and is caring for ADLs independently.  While pt is chronic risk of harm, pt is NOT an acute or imminent risk of harm to self or others.  Therefore treatment team has NO clinical indication to seek court order for involuntary retention.  Pt will be discharged on 3 day letter.     1. Will d/c home on current regimen.  2. Psychoeducation provided regarding importance of compliance with outpatient appointments and medications.  Pt declined inpatient substance use rehab on this admission.  3. Pt was advised to remain abstinent with substances.  4. Pt was advised to dial 911 or return to ER if they become danger to self or others.

## 2023-11-30 NOTE — BH INPATIENT PSYCHIATRY DISCHARGE NOTE - REASON FOR ADMISSION
The patient was brought into the ED for his safety by concerned self.
mood decompensation in setting of substance use and not being in psychiatric treatment

## 2023-11-30 NOTE — BH INPATIENT PSYCHIATRY DISCHARGE NOTE - ADULT OR CHILD PROTECTIVE SERVICES INVOLVEMENT
DISCHARGE INSTRUCTIONS:   1. If Breast feeding feed your infant 8-12 times a day. It is helpful to keep a breastfeeding log.  2. If Bottle feeding, infant should eat every 3-4 hours. NICU D/C's - if bottle feeding, feed as instructed. Do not prop bottle. This could cause the infant to choke.   3. Clean cord with alcohol at least 3-4 times each day until cord comes off.  4. If circumcised, apply bacitracin and gauze until site is healed.   5. All infants and children should be secured in car seats when in a vehicle.   6. Limit public exposure to decrease risk of infection.  7. To prevent risk of SIDS, infants should be placed on their back to sleep. Infants should sleep in own crib, not with parents. DO NOT place infants on stomach to sleep.   8. NEVER SHAKE a baby. This can cause brain damage and developmental delays.   9. PLAY THERAPY: make sure infant has supervised belly time.  10. NO smoking in the same house as infant.   11. If you have other questions or concerns call your Pediatrician or Neonatologist.     NOTIFY YOUR PEDIATRICIAN FOR THE FOLLOWIN. Excessive irritability or continuous crying  2. Very lethargic (unable to wake up) or restless  3. Color changes such as jaundice (yellow), mottling, or cyanosis (blue/gray)  4. Vomiting or diarrhea   5. If infant is spitting up, especially if very forceful (if spits up half a feeding 2 or more times in a row)  6. Respiratory problems (i.e. Flaring, grunting, retracting, breathing fast, if infant looks like they are working hard to breathe, or if not breathing for more than 20 seconds-apnea)   7. Fewer than 4 wet diapers a day, if breast feeding, keep a diary of wet and dirty diapers  8. Temperature less than 97.6?F or greater than 100?F axillary  9. If circumcised, watch for bleeding and infection at site.  
No
No

## 2023-11-30 NOTE — BH INPATIENT PSYCHIATRY DISCHARGE NOTE - HPI (INCLUDE ILLNESS QUALITY, SEVERITY, DURATION, TIMING, CONTEXT, MODIFYING FACTORS, ASSOCIATED SIGNS AND SYMPTOMS)
33 yo male, single, childless, undomiciled, psychiatric diagnosis of depression, no prior hospitalizations, not currently in treatment, polysubstance use (crack cocaine, heroin, alcohol), denies legal / violence hx, medical diagnosis of HTN not on medications, BIB self and admitted 9.13 due to worsening low mood and SI.     On arrival to , patient reports 1 month worsening low mood in setting of psychosocial stressors. Patient states he is currently homeless and living with friends, has no job and has no financial support aside from inconsistently receiving money from his family. He wants to get his life back together but does not know where to start. He feels his family is hesitant to support him because they feel he will spend their money on drugs. He acknowledges struggling with cocaine use and last used crack cocaine a few days ago. he reports past problems with alcohol and heroin. he reports last alcohol use was two days ago and he denies cravings, sx of withdrawal. Patient reports sleeping poorly, low appetite, trouble concentrating, thoughts of not wanting to live. pt has no intent but has thought of plan including running into traffic or jumping off a bridge. He feels something is wrong with his brain. he denies manic symptoms including decreased need for sleep, high energy. patient reports past occasions of AH and feeling paranoid but states this was always in the context of substance use. he reports 1 past rehab stint but felt it as not helpful. Patient currently denies intent to harm himself on the unit and feels safe. he does not have thoughts of harming others. we discuss Sertraline; rare and common side effects discussed with patient and he is agreeable to trial.     PER ED ASSESSMENT NOTE:   Patient reports that he has been feeling depressed for 2 month and the symptoms are getting worse. He c/o insomnia, poor energy level, hopelessness and poor appetite, he lost "almost 20 lbs within 2 month". He states that he has poor concentration, feels helpless. He has been feeling "I don't want to live, I don't want to wake up" he states that he has been homeless lately, he used to live with his mother, but she couldn't pay the rent and she moved out, he is not employed, he used to work with his father who fired him and since then he is not able to find a job. He states that sometimes he drinks a beer or two, but denies any recent use of cocaine. He states that his depression is getting worse and he has been thinking about jumping in front of a train. No prior SA, but he states that he does not trust himself and he is worried that he might act on it. He has no other complaints. patient denies any psychotic or manic symptoms, no Ah/VH, no delusions. patient admits to paranoid delusions when he uses cocaine, but he denies any recent use. HE denies any manic symptoms at present time and in the past. no diminished need for sleep No goal directed activity, diminished need for sleep, No grandiosity, spending spree.   Patient states that he is not able to function, has poor energy and amotivated, trying to find a job, "but not trying that hard" hopeless and helpless. As per ER Behavioral Health Assessment performed on 11/21/2023 at [facility]: "The patient is 32 years old single, unemployed, homeless  male, non-caregiver, with no significant PMH, PPHx of self reported depression, HX of polysubstance dependence and one rehab for substance abuse , came to Er c/o feeling depressed and suicidal; not in treatment at present time.  Patient reports that he has been feeling depressed for 2 month and the symptoms are getting worse. He c/o insomnia, poor energy level, hopelessness and poor appetite, he lost "almost 20 lbs within 2 month". He states that he has poor concentration, feels helpless. He has been feeling "I don't want to live, I don't want to wake up" he states that he has been homeless lately, he used to live with his mother, but she couldn't pay the rent and she moved out, he is not employed, he used to work with his father who fired him and since then he is not able to find a job. He states that sometimes he drinks a beer or two, but denies any recent use of cocaine. He states that his depression is getting worse and he has been thinking about jumping in front of a train. No prior SA, but he states that he does not trust himself and he is worried that he might act on it. He has no other complaints. patient denies any psychotic or manic symptoms, no Ah/VH, no delusions. patient admits to paranoid delusions when he uses cocaine, but he denies any recent use. He denies any manic symptoms at present time and in the past. no diminished need for sleep No goal directed activity, diminished need for sleep, No grandiosity, spending spree. Patient states that he is not able to function, has poor energy and amotivated, trying to find a job, "but not trying that hard" hopeless and helpless."

## 2023-11-30 NOTE — BH INPATIENT PSYCHIATRY DISCHARGE NOTE - OTHER PAST PSYCHIATRIC HISTORY (INCLUDE DETAILS REGARDING ONSET, COURSE OF ILLNESS, INPATIENT/OUTPATIENT TREATMENT)
According to  Behavioral health assessment, the patient is 32 years old single, unemployed, homeless  male, non-caregiver, with no significant PMH, PPHx of self reported depression, HX of polysubstance dependence and one rehab for substance abuse , came to Er c/o feeling depressed and suicidal; not in treatment at present time.  Patient reports that he has been feeling depressed for 2 month and the symptoms are getting worse. He c/o insomnia, poor energy level, hopelessness and poor appetite, he lost "almost 20 lbs within 2 month". He states that he has poor concentration, feels helpless. He has been feeling "I don't want to live, I don't want to wake up" he states that he has been homeless lately, he used to live with his mother, but she couldn't pay the rent and she moved out, he is not employed, he used to work with his father who fired him and since then he is not able to find a job. He states that sometimes he drinks a beer or two, but denies any recent use of cocaine. He states that his depression is getting worse and he has been thinking about jumping in front of a train. No prior SA, but he states that he does not trust himself and he is worried that he might act on it. He has no other complaints. patient denies any psychotic or manic symptoms, no Ah/VH, no delusions. patient admits to paranoid delusions when he uses cocaine, but he denies any recent use. HE denies any manic symptoms at present time and in the past. no diminished need for sleep No goal directed activity, diminished need for sleep, No grandiosity, spending spree. " See above.

## 2023-11-30 NOTE — BH INPATIENT PSYCHIATRY DISCHARGE NOTE - NSBHFUPINTERVALHXFT_PSY_A_CORE
No overnight events.  Pt today reports he is feeling well, sleeping and eating adequately, without any thoughts of harming self or others.  Pt states he is looking forward to discharge and has long term goals of obtaining employment and making money.

## 2023-11-30 NOTE — BH INPATIENT PSYCHIATRY DISCHARGE NOTE - NSBHDCHANDOFFFT_PSY_ALL_CORE
Clinical handoff including hospital course, diagnosis, and treatment was sent to: [facility] [phone].

## 2023-11-30 NOTE — BH INPATIENT PSYCHIATRY DISCHARGE NOTE - NSSUICRSKFACTOR_PSY_ALL_CORE
Current and Past Psychiatric Diagnoses
Current and Past Psychiatric Diagnoses/Presenting Symptoms/Activating Events/Stressors

## 2023-11-30 NOTE — BH INPATIENT PSYCHIATRY DISCHARGE NOTE - NSDCCPCAREPLAN_GEN_ALL_CORE_FT
PRINCIPAL DISCHARGE DIAGNOSIS  Diagnosis: Major depressive disorder  Assessment and Plan of Treatment: Medication management and psychotherapy      SECONDARY DISCHARGE DIAGNOSES  Diagnosis: Substance use disorder  Assessment and Plan of Treatment: Routine mental health follow up    
PRINCIPAL DISCHARGE DIAGNOSIS  Diagnosis: Major depression  Assessment and Plan of Treatment: Sertraline + wellbutrin

## 2023-11-30 NOTE — BH INPATIENT PSYCHIATRY DISCHARGE NOTE - NSBHSAALC_PSY_A_CORE FT
last use 3 days ago as per the pt, 1-2 cans of beer, No HX of w/d seizures, No Hx of DT's or blackouts. Last use 3 days prior to admission as per the pt, 1-2 cans of beer, No HX of w/d seizures, No Hx of DT's or blackouts.

## 2023-11-30 NOTE — BH INPATIENT PSYCHIATRY DISCHARGE NOTE - NSDCPROCEDURES_PSY_ALL_CORE
There were no significant procedures or tests performed during this admission.
There were no significant procedures or tests performed during this admission.

## 2023-11-30 NOTE — BH INPATIENT PSYCHIATRY DISCHARGE NOTE - DESCRIPTION
single, childless, unemployed, undomiciled. highest level of education was high school degree. 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 not on disability  Single, no dependents, unemployed, undomiciled.  Highest level of education was high school degree. 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

## 2023-11-30 NOTE — BH INPATIENT PSYCHIATRY DISCHARGE NOTE - HOSPITAL COURSE
Patient admitted with concerns for low mood and SI in setting of medication nonadherence + not being in psychiatric treatment. pt met criteria for depressive episode. pt provided with psychoeductation and started on Zoloft increased to 50mg. pt during middle of hospital course requested to be on Wellbutrin, stated he tried this in the past and found it helpful. on 11/27, patient submitted 3-day letter requesting discharge. He reported feeling his mood was better and he was no longer suicidal and his mood was contextually related to his psychosocial stressors. he worked out an agreement with his family and will be able to reside with them which he was happy about. during entirety of hospitalization, there were no behavioral issues warranting STAT medications. there were no overt psychotic and manic sx. with utox + for cocaine, stimulant withdrawal can mimic depressive episode. abstinence from substances along with psychopharm management likely attributed to his mood improvements. On 11/27, patient submitted 3-day letter requesting discharge. pt not expressing SI or HI, able to care for himself . he does not meet retention criteria at this time and will be discharged per his request. he was offered inpatient substance use rehab and declined.     Patient is at chronically elevated risk for self-harm due to age, psych dx, hx of non-adherence to treatment, substance use hx, ??personality pathology, financial stressors, hx of impulsivity, poor insight when non-adherent to medications. At this time, those risks are mitigated by patient’s expressed desire to live. Patient currently denies SIIP.    Protective factors that mitigate acute risk includes current stability of symptoms, adherence to medications at this time, no past SA, future oriented thinking, improved frustration tolerance as evidenced by no behavioral issues on the unit,  improved insight and judgement, abstinence from substance while in a controlled setting and with intact reality testing, Ep[employer] and spirituality, supportive family members, access to treatment and care, no access to fire arms     At present, patient has remained in good behavioral control while inpatient. They have not recently displayed any concerning symptoms of behavioral dysregulation or decompensation, has been actively participating in the milieu, expressing future oriented thinking and is insightful about self-harm. Currently, patient is adamant about their denial of suicidal ideation intent or plan. They are not at acutely elevated risk for self-harm at this time.     low acute risk for harm to others. risk factors include male sex, substance use hx, poor insight when psychiatrically decompensated. protective factors include no known hx of violence, pt not endorsing directed violence towards anyone or property, has intact reality testing and understands the consequences of harm to others. no access to firearms
Pt was admitted to [facility] with depressive symptoms and suicidality, in context of recent substance use.  Etiology of presentation met criteria for major depressive episode, however substance induced component was also likely.  Pt was started on sertraline and bupropion with good effect.  He was seen for individual sessions by psychology.  On this treatment regimen, pt reported improvement in depression and resolution of suicidality.  He was euthymic in affect, linear in thought process, goal directed in behavior.  He was visible on unit, social with peers, active in therapeutic milieu.  He was compliant with medications and basic care, and maintained ADLs independently.  He became future oriented, and he consistently denied suicidality and homicidality.  Pt declined inpatient substance use rehab referrals, but was amenable to outpatient mental health follow up.  Pt submitted 3 day letter.  Given that he was no longer an acute or imminent risk of harm to self or others, treatment team did not have clinical indication to seek court order for involuntary retention.  Pt was discharged with outpatient follow up.

## 2023-11-30 NOTE — BH INPATIENT PSYCHIATRY DISCHARGE NOTE - NSDCMRMEDTOKEN_GEN_ALL_CORE_FT
buPROPion 150 mg/24 hours (XL) oral tablet, extended release: 1 tab(s) orally once a day  sertraline 50 mg oral tablet: 1 tab(s) orally once a day  
buPROPion 150 mg/24 hours (XL) oral tablet, extended release: 1 tab(s) orally once a day  sertraline 50 mg oral tablet: 1 tab(s) orally once a day

## 2023-11-30 NOTE — BH INPATIENT PSYCHIATRY DISCHARGE NOTE - DETAILS
Pt believes that his brother has either schizophrenia or a personality disorder. His grandmother has OCD and mother has anxiety. 
Pt believes that his brother has either schizophrenia or a personality disorder. His grandmother has OCD and mother has anxiety.

## 2023-12-18 NOTE — SOCIAL WORK POST DISCHARGE FOLLOW UP NOTE - NSBHSWFOLLOWUP_PSY_ALL_CORE_FT
This writer called pt on 12/18/23 in regards to him missing his aftercare appointment and pt's phone went straight to voicemail. This writer left a message requesting a call back in order to reschedule missed appointment.

## 2024-01-02 NOTE — SOCIAL WORK POST DISCHARGE FOLLOW UP NOTE - NSBHSWFOLLOWUP_PSY_ALL_CORE_FT
This writer called pt, which went to voicemail. This writer provided contact information should pt request follow up appointment as he did not follow up with original appointment. Writer called again on 1/2/24. Case to be closed.

## 2024-01-28 ENCOUNTER — EMERGENCY (EMERGENCY)
Facility: HOSPITAL | Age: 34
LOS: 1 days | Discharge: HOME | End: 2024-01-28
Attending: EMERGENCY MEDICINE
Payer: COMMERCIAL

## 2024-01-28 VITALS
SYSTOLIC BLOOD PRESSURE: 155 MMHG | TEMPERATURE: 98 F | WEIGHT: 199.96 LBS | RESPIRATION RATE: 18 BRPM | HEART RATE: 109 BPM | HEIGHT: 73 IN | DIASTOLIC BLOOD PRESSURE: 108 MMHG

## 2024-01-28 LAB
ALBUMIN SERPL ELPH-MCNC: 4.4 G/DL — SIGNIFICANT CHANGE UP (ref 3.3–5.2)
ALP SERPL-CCNC: 109 U/L — SIGNIFICANT CHANGE UP (ref 40–120)
ALT FLD-CCNC: 32 U/L — SIGNIFICANT CHANGE UP
AMPHET UR-MCNC: NEGATIVE — SIGNIFICANT CHANGE UP
ANION GAP SERPL CALC-SCNC: 12 MMOL/L — SIGNIFICANT CHANGE UP (ref 5–17)
AST SERPL-CCNC: 25 U/L — SIGNIFICANT CHANGE UP
BARBITURATES UR SCN-MCNC: NEGATIVE — SIGNIFICANT CHANGE UP
BASOPHILS # BLD AUTO: 0.04 K/UL — SIGNIFICANT CHANGE UP (ref 0–0.2)
BASOPHILS NFR BLD AUTO: 0.4 % — SIGNIFICANT CHANGE UP (ref 0–2)
BENZODIAZ UR-MCNC: NEGATIVE — SIGNIFICANT CHANGE UP
BILIRUB SERPL-MCNC: 0.8 MG/DL — SIGNIFICANT CHANGE UP (ref 0.4–2)
BUN SERPL-MCNC: 8.1 MG/DL — SIGNIFICANT CHANGE UP (ref 8–20)
CALCIUM SERPL-MCNC: 9.5 MG/DL — SIGNIFICANT CHANGE UP (ref 8.4–10.5)
CHLORIDE SERPL-SCNC: 99 MMOL/L — SIGNIFICANT CHANGE UP (ref 96–108)
CO2 SERPL-SCNC: 25 MMOL/L — SIGNIFICANT CHANGE UP (ref 22–29)
COCAINE METAB.OTHER UR-MCNC: POSITIVE
CREAT SERPL-MCNC: 0.72 MG/DL — SIGNIFICANT CHANGE UP (ref 0.5–1.3)
EGFR: 123 ML/MIN/1.73M2 — SIGNIFICANT CHANGE UP
EOSINOPHIL # BLD AUTO: 0.16 K/UL — SIGNIFICANT CHANGE UP (ref 0–0.5)
EOSINOPHIL NFR BLD AUTO: 1.5 % — SIGNIFICANT CHANGE UP (ref 0–6)
ETHANOL SERPL-MCNC: <10 MG/DL — SIGNIFICANT CHANGE UP (ref 0–9)
FLUAV AG NPH QL: SIGNIFICANT CHANGE UP
FLUBV AG NPH QL: SIGNIFICANT CHANGE UP
GLUCOSE SERPL-MCNC: 93 MG/DL — SIGNIFICANT CHANGE UP (ref 70–99)
HCT VFR BLD CALC: 43 % — SIGNIFICANT CHANGE UP (ref 39–50)
HGB BLD-MCNC: 14.1 G/DL — SIGNIFICANT CHANGE UP (ref 13–17)
IMM GRANULOCYTES NFR BLD AUTO: 0.2 % — SIGNIFICANT CHANGE UP (ref 0–0.9)
LYMPHOCYTES # BLD AUTO: 3.58 K/UL — HIGH (ref 1–3.3)
LYMPHOCYTES # BLD AUTO: 34.4 % — SIGNIFICANT CHANGE UP (ref 13–44)
MCHC RBC-ENTMCNC: 28.7 PG — SIGNIFICANT CHANGE UP (ref 27–34)
MCHC RBC-ENTMCNC: 32.8 GM/DL — SIGNIFICANT CHANGE UP (ref 32–36)
MCV RBC AUTO: 87.4 FL — SIGNIFICANT CHANGE UP (ref 80–100)
METHADONE UR-MCNC: NEGATIVE — SIGNIFICANT CHANGE UP
MONOCYTES # BLD AUTO: 0.91 K/UL — HIGH (ref 0–0.9)
MONOCYTES NFR BLD AUTO: 8.7 % — SIGNIFICANT CHANGE UP (ref 2–14)
NEUTROPHILS # BLD AUTO: 5.7 K/UL — SIGNIFICANT CHANGE UP (ref 1.8–7.4)
NEUTROPHILS NFR BLD AUTO: 54.8 % — SIGNIFICANT CHANGE UP (ref 43–77)
OPIATES UR-MCNC: NEGATIVE — SIGNIFICANT CHANGE UP
PCP SPEC-MCNC: SIGNIFICANT CHANGE UP
PCP UR-MCNC: NEGATIVE — SIGNIFICANT CHANGE UP
PLATELET # BLD AUTO: 359 K/UL — SIGNIFICANT CHANGE UP (ref 150–400)
POTASSIUM SERPL-MCNC: 4.1 MMOL/L — SIGNIFICANT CHANGE UP (ref 3.5–5.3)
POTASSIUM SERPL-SCNC: 4.1 MMOL/L — SIGNIFICANT CHANGE UP (ref 3.5–5.3)
PROT SERPL-MCNC: 7.6 G/DL — SIGNIFICANT CHANGE UP (ref 6.6–8.7)
RBC # BLD: 4.92 M/UL — SIGNIFICANT CHANGE UP (ref 4.2–5.8)
RBC # FLD: 13.1 % — SIGNIFICANT CHANGE UP (ref 10.3–14.5)
RSV RNA NPH QL NAA+NON-PROBE: SIGNIFICANT CHANGE UP
SARS-COV-2 RNA SPEC QL NAA+PROBE: SIGNIFICANT CHANGE UP
SODIUM SERPL-SCNC: 136 MMOL/L — SIGNIFICANT CHANGE UP (ref 135–145)
THC UR QL: NEGATIVE — SIGNIFICANT CHANGE UP
WBC # BLD: 10.41 K/UL — SIGNIFICANT CHANGE UP (ref 3.8–10.5)
WBC # FLD AUTO: 10.41 K/UL — SIGNIFICANT CHANGE UP (ref 3.8–10.5)

## 2024-01-28 PROCEDURE — 99291 CRITICAL CARE FIRST HOUR: CPT

## 2024-01-28 PROCEDURE — 90792 PSYCH DIAG EVAL W/MED SRVCS: CPT

## 2024-01-28 RX ORDER — HALOPERIDOL DECANOATE 100 MG/ML
5 INJECTION INTRAMUSCULAR ONCE
Refills: 0 | Status: COMPLETED | OUTPATIENT
Start: 2024-01-28 | End: 2024-01-28

## 2024-01-28 RX ADMIN — HALOPERIDOL DECANOATE 5 MILLIGRAM(S): 100 INJECTION INTRAMUSCULAR at 14:10

## 2024-01-28 RX ADMIN — Medication 2 MILLIGRAM(S): at 14:10

## 2024-01-28 NOTE — CHART NOTE - NSCHARTNOTEFT_GEN_A_CORE
SWnote: p/c from pt's ED MD(dr Irvin) to inform worker pt and pt's mother interested in detox referral. Worker met with pt , pt's mother leaving at worker's arrival. Worker explained SW services, encouraged pt to share his substances habit . Pt states he uses crack cocaine and alcohol ,has been doing more cocaine than alcohol in the past days . Pt unable to follow the conversation too well , unable to provide amount or frequency . Worker informed pt about referral options  to programs, pt seemed somewhat interested . States he was at [facility] in the past ,worker will send referral ,awaiting labs and EKG. Case will be in SW in order to give time to pt to clarify his thoughts and to confirm interest in referral . Worker will leave case  in SW hand off to f/u in the am .

## 2024-01-28 NOTE — ED PROVIDER NOTE - PROGRESS NOTE DETAILS
[place]: Patient awake, calm, cooperative, clinically sober.  Mom at bedside.  Patient asking for detox.  He is also very hungry.  Labs and food ordered.  Social work aware. [place]: Patient awake, calm, cooperative, clinically sober.  No longer agitated, not internally preoccupied, not hallucinating now that he is sober. Mom at bedside.  Patient asking for detox.  He is also very hungry.  Labs and food ordered.  Social work aware. [place]: Patient medically cleared, cleared by psych. Stable for inpatient detox.

## 2024-01-28 NOTE — ED ADULT NURSE NOTE - NSFALLUNIVINTERV_ED_ALL_ED
Bed/Stretcher in lowest position, wheels locked, appropriate side rails in place/Call bell, personal items and telephone in reach/Instruct patient to call for assistance before getting out of bed/chair/stretcher/Non-slip footwear applied when patient is off stretcher/[place] to call system/Physically safe environment - no spills, clutter or unnecessary equipment/Purposeful proactive rounding/Room/bathroom lighting operational, light cord in reach

## 2024-01-28 NOTE — ED PROVIDER NOTE - OBJECTIVE STATEMENT
33-year-old male with past medical history of polysubstance abuse presents for acute cocaine intoxication.  Mother provides a history as patient is too intoxicated.  Patient has reportedly been on a cocaine/crack binge for the last 5 days, has not been sleeping.  He lives in [place], went to his father's house in [place] but was not allowed in because he was intoxicated.  Mom drove from [facility] to [place] to pick him up, tried to take him to [facility] for detox, but patient was too intoxicated with elevated heart rate and elevated blood pressure and was sent here for medical clearance.  Patient is acutely agitated, picking at bugs that are not there, hyperactive, internally preoccupied, requiring medication for agitation.

## 2024-01-28 NOTE — ED PROVIDER NOTE - PHYSICAL EXAMINATION
Gen: Well appearing, but acute intoxicated, hyperactive, picking at things on skin and feet.  Head: NC/AT  Neck: trachea midline  Resp:  No distress  Ext: no deformities  Neuro:  Alert, but not oriented, neuro exam appears non focal, moves all extremities without difficulty  Skin:  Warm and dry as visualized  Psych:  Agitated, very paranoid, internally preoccupied, unable to follow commands.

## 2024-01-28 NOTE — ED PROVIDER NOTE - CLINICAL SUMMARY MEDICAL DECISION MAKING FREE TEXT BOX
33-year-old male with past medical history of polysubstance abuse presents for acute crack/cocaine intoxication.  Patient hallucinating, agitated, unable to follow commands, requiring IM medication for acute agitation.  No outward signs of trauma.  Will reassess when clinically sober regarding desires to go to detox.

## 2024-01-28 NOTE — ED ADULT NURSE NOTE - OBJECTIVE STATEMENT
pt a&ox3, vss, pt biba w/ acute cocaine intox. pt acting agitated and unable to sit still. pt's mom @ bedside explaining pt was recently kicked out of dads house for intoxication, respirations even and unlabored, meds gvn per rx, POC discussed w/ patient, will continue to reassess.

## 2024-01-28 NOTE — ED ADULT TRIAGE NOTE - CHIEF COMPLAINT QUOTE
c/o feeling agitated due to smoking crack x 5 days and not eating,drinking or sleeping says he was in [facility] a month ago and would like to go back for rehab

## 2024-01-28 NOTE — SBIRT NOTE ADULT - NSSBIRTUNABLESCROTHER_GEN_A_CORE
pt still somewhat under the influence ,states he may be interested in referral to program /unable to provide reliable info about amount and frequency of substances intake. SW will continue to follow.

## 2024-01-28 NOTE — ED ADULT NURSE NOTE - CHIEF COMPLAINT QUOTE
c/o feeling agitated due to smoking crack x 5 days and not eating,drinking or sleeping says he was in [facility] a month ago and would like to go back for rehab

## 2024-01-28 NOTE — ED PROVIDER NOTE - CRITICAL CARE ATTENDING CONTRIBUTION TO CARE
Naomi PERKINS DO, have personally provided 45 minutes of critical care time exclusive of time spent on separately billable procedures. Time includes review of laboratory data, radiology results, discussion with consultants, and monitoring for potential decompensation. Interventions were performed as documented above.

## 2024-01-29 VITALS
TEMPERATURE: 98 F | DIASTOLIC BLOOD PRESSURE: 77 MMHG | SYSTOLIC BLOOD PRESSURE: 147 MMHG | HEART RATE: 88 BPM | RESPIRATION RATE: 19 BRPM | OXYGEN SATURATION: 97 %

## 2024-01-29 DIAGNOSIS — F15.90 OTHER STIMULANT USE, UNSPECIFIED, UNCOMPLICATED: ICD-10-CM

## 2024-01-29 PROCEDURE — 87637 SARSCOV2&INF A&B&RSV AMP PRB: CPT

## 2024-01-29 PROCEDURE — 99291 CRITICAL CARE FIRST HOUR: CPT

## 2024-01-29 PROCEDURE — 96372 THER/PROPH/DIAG INJ SC/IM: CPT

## 2024-01-29 PROCEDURE — 36415 COLL VENOUS BLD VENIPUNCTURE: CPT

## 2024-01-29 PROCEDURE — 80307 DRUG TEST PRSMV CHEM ANLYZR: CPT

## 2024-01-29 PROCEDURE — 80053 COMPREHEN METABOLIC PANEL: CPT

## 2024-01-29 PROCEDURE — 93010 ELECTROCARDIOGRAM REPORT: CPT

## 2024-01-29 PROCEDURE — 93005 ELECTROCARDIOGRAM TRACING: CPT

## 2024-01-29 PROCEDURE — 82962 GLUCOSE BLOOD TEST: CPT

## 2024-01-29 PROCEDURE — 85025 COMPLETE CBC W/AUTO DIFF WBC: CPT

## 2024-01-29 RX ORDER — HYDROXYZINE HCL 10 MG
25 TABLET ORAL ONCE
Refills: 0 | Status: COMPLETED | OUTPATIENT
Start: 2024-01-29 | End: 2024-01-29

## 2024-01-29 RX ORDER — ALPRAZOLAM 0.25 MG
0.25 TABLET ORAL ONCE
Refills: 0 | Status: DISCONTINUED | OUTPATIENT
Start: 2024-01-29 | End: 2024-01-29

## 2024-01-29 RX ORDER — NICOTINE POLACRILEX 2 MG
1 GUM BUCCAL DAILY
Refills: 0 | Status: DISCONTINUED | OUTPATIENT
Start: 2024-01-29 | End: 2024-02-05

## 2024-01-29 RX ADMIN — Medication 25 MILLIGRAM(S): at 14:23

## 2024-01-29 RX ADMIN — Medication 0.25 MILLIGRAM(S): at 20:44

## 2024-01-29 RX ADMIN — Medication 25 MILLIGRAM(S): at 20:14

## 2024-01-29 RX ADMIN — Medication 1 PATCH: at 12:50

## 2024-01-29 NOTE — ED BEHAVIORAL HEALTH ASSESSMENT NOTE - DETAILS
[facility] N/A See HPI self referred Advised patient to go to nearest ER or call 911, for any of the followin) agitation aggression or anxiety, 2) suicidal or homicidal thoughts 3) worsening of symptoms or 4) side effects of medications

## 2024-01-29 NOTE — ED BEHAVIORAL HEALTH ASSESSMENT NOTE - SUMMARY
Pt is a 33 year old male, single, non-caregiver, unemployed, unstable housing ( primarily lives with father) with no significant PMH, PPHx of self reported depression, HX of polysubstance dependence, hx of rehab for substance abuse, 1 prior inpatient psychiatric stay at [facility] in Nov. 2023, BIB mother and is presenting to the ER due to recent cocaine "binge". Shortly after arrival to the ED pt had an episode of agitation and was given IM medications for safety ( see ED note). Upon assessment today, Pt is calm and cooperative and describes his mood as "bored".  Pt reports almost every day cocaine usage, last use 2 hours prior to arrival with a 5 day binge ($400.00 worth) used via insufflation/smoked. Everyday tobacco smoker, 1 pack per day, prescribed buproprion 300 mg/per day- last taken over a week ago. Everyday drinker, 3 tallboys per day denies hx of seizures or withdrawal sx. Pt expresses non compliance with medication x 1 week due to cocaine usage. Pt denies depressive or anxious symptoms at this time though notes past history. He has no other complaints. Denies current SI, intent or plan, denies HI, no AH/VH, no delusions.   Pt willing and amenable to treatment, pending transfer to rehab for services. Pt is a 33 year old male, single, non-caregiver, unemployed, unstable housing ( primarily lives with father) with no significant PMH, PPHx of self reported depression, HX of polysubstance dependence, hx of rehab for substance abuse, 1 prior inpatient psychiatric stay at [facility] in Nov. 2023, BIB mother and is presenting to the ER due to recent cocaine "binge".   Patient denies any acute psychiatric issues concerns or complaints. No indication for admission to an acute inpatient psychiatric unit. Combined psychopharmacology and outpatient psychotherapy are the primary treatment modalities that are most likely to assist the patient in developing more productive means of managing his mental health conditions and navigating stressors/substance use, rather than an inpatient psychiatric admission.  Pt willing and amenable to treatment, pending transfer to rehab for services.  Requests restarting bupropion.

## 2024-01-29 NOTE — ED ADULT NURSE REASSESSMENT NOTE - NS ED NURSE REASSESS COMMENT FT1
RN called diet office regarding a breakfast tray. as per diet office, they will send one down for pt. pt remains resting in bed comfortably at this time.

## 2024-01-29 NOTE — ED BEHAVIORAL HEALTH ASSESSMENT NOTE - SAFETY PLAN COMPLETED
Yes

## 2024-01-29 NOTE — ED BEHAVIORAL HEALTH ASSESSMENT NOTE - DESCRIPTION
No known Per ED note:  33-year-old male with past medical history of polysubstance abuse presents for acute cocaine intoxication.  Mother provides a history as patient is too intoxicated.  Patient has reportedly been on a cocaine/crack binge for the last 5 days, has not been sleeping.  He lives in [place], went to his father's house in [place] but was not allowed in because he was intoxicated.  Mom drove from [facility] to [place] to pick him up, tried to take him to [facility] for detox, but patient was too intoxicated with elevated heart rate and elevated blood pressure and was sent here for medical clearance.  Patient is acutely agitated, picking at bugs that are not there, hyperactive, internally preoccupied, requiring medication for agitation    Vital Signs Last 24 Hrs  T(C): 36.6 (29 Jan 2024 11:56), Max: 36.8 (28 Jan 2024 20:03)  T(F): 97.8 (29 Jan 2024 11:56), Max: 98.2 (28 Jan 2024 20:03)  HR: 67 (29 Jan 2024 11:56) (67 - 109)  BP: 140/81 (29 Jan 2024 11:56) (130/78 - 155/108)  BP(mean): --  RR: 20 (29 Jan 2024 11:56) (18 - 20)  SpO2: 95% (29 Jan 2024 11:56) (95% - 96%)    Parameters below as of 29 Jan 2024 11:56  Patient On (Oxygen Delivery Method): room air 33 year old male, single, non-caregiver, unemployed, unstable housing ( primarily lives with father)

## 2024-01-29 NOTE — ED BEHAVIORAL HEALTH ASSESSMENT NOTE - NSBHMSEKNOW_PSY_A_CORE
68 year old female with a h/o copd presents with c/o shortness of breath. pt is currently a smoker and is on home oxygen. pt states that for the last 3 days she has been having more symptoms. she denies any chest pain or associated nausea vomiting or diaphoresis. pt aslo denies any pnd orthopnea or pedal edema or recent travel or leg pain or swelling
Normal

## 2024-01-29 NOTE — ED BEHAVIORAL HEALTH ASSESSMENT NOTE - RISK ASSESSMENT
rf:  substance abuse  mental health conditions    pf:  family support  no prior sa/nssib  future oriented, forward looking  help seeking

## 2024-01-29 NOTE — ED ADULT NURSE REASSESSMENT NOTE - NS ED NURSE REASSESS COMMENT FT1
RN called diet office again, following up for food tray to be brought to pt. as per Johnnie in diet office, food is going to be brought down for pt now.

## 2024-01-29 NOTE — ED ADULT NURSE REASSESSMENT NOTE - NS ED NURSE REASSESS COMMENT FT1
Pt. A+Ox4, resting comfortably, appears in NAD. Pt. denies NVD. Pt. pending SBIRT. Pt. safety maintained, charting as noted.

## 2024-01-29 NOTE — ED ADULT NURSE REASSESSMENT NOTE - NS ED NURSE REASSESS COMMENT FT1
Received pt from blank RN. pt sitting quietly in stretcher NAD noted a this time. pt awaiting placement. No new orders a this time pt safety ongoing. Received pt from Cuco RAMIREZ. pt sitting quietly in stretcher NAD noted a this time. pt awaiting placement. No new orders a this time pt safety ongoing.

## 2024-01-29 NOTE — ED BEHAVIORAL HEALTH ASSESSMENT NOTE - HPI (INCLUDE ILLNESS QUALITY, SEVERITY, DURATION, TIMING, CONTEXT, MODIFYING FACTORS, ASSOCIATED SIGNS AND SYMPTOMS)
Pt is a 33 year old male, single, non-caregiver, unemployed, unstable housing ( primarily lives with father) with no significant PMH, PPHx of self reported depression, HX of polysubstance dependence, hx of rehab for substance abuse, 1 prior inpatient psychiatric stay at [facility] in Nov. 2023, BIB mother and is presenting to the ER due to recent cocaine "binge". Shortly after arrival to the ED pt had an episode of agitation and was given IM medications for safety ( see ED note). Upon assessment today,  Pt is calm and cooperative and describes his mood as "bored". Pt endorses recent release from rehab 01/10/24 and sobriety from cocaine ( not alcohol) up until [id number] when he restarted -using daily and presented to the ER due to cocaine withdrawal symptoms and decreased sleep.  Pt reports almost every day cocaine usage, last use 2 hours prior to arrival with a 5 day binge ($400.00 worth) used via insufflation/ smoked. Everyday tobacco smoker, 1 pack per day, prescribed buproprion 300 mg/per day- last taken over a week ago. Everyday drinker, 3 tallboys per day denies hx of seizures or withdrawal sx. Pt expresses non compliance with medication x 1 week due to cocaine usage. Pt denies depressive or anxious symptoms at this time though notes past history. He has no other complaints. Denies current SI, intent or plan, denies HI, no AH/VH, no delusions.   Pt willing and amenable to treatment, pending transfer to rehab for services. Pt is a 33 year old male, single, non-caregiver, unemployed, unstable housing ( primarily lives with father) with no significant PMH, PPHx of self reported depression, HX of polysubstance dependence, hx of rehab for substance abuse, 1 prior inpatient psychiatric stay at [facility] in Nov. 2023, BIB mother and is presenting to the ER due to recent cocaine "binge". Shortly after arrival to the ED pt had an episode of agitation and was given IM medications for safety (see ED note). Upon assessment today,  Pt is calm and cooperative and describes his mood as "bored". Pt endorses recent release from rehab 01/10/24 and sobriety from cocaine ( not alcohol) up until [id number] when he restarted -using daily and presented to the ER due to cocaine withdrawal symptoms and decreased sleep.  Pt reports almost every day cocaine usage, last use 2 hours prior to arrival with a 5 day binge ($400.00 worth) used via insufflation/ smoked. Everyday tobacco smoker, 1 pack per day, prescribed buproprion 300 mg/per day- last taken over a week ago. Everyday drinker, 3 tallboys per day denies hx of seizures or withdrawal sx. Pt expresses non compliance with medication x 1 week due to cocaine usage. Pt denies depressive or anxious symptoms at this time though notes past history. He has no other complaints. Denies current SI, intent or plan, denies HI, no AH/VH, no delusions.   Pt willing and amenable to treatment, pending transfer to rehab for services.

## 2024-01-29 NOTE — ED ADULT NURSE REASSESSMENT NOTE - NS ED NURSE REASSESS COMMENT FT1
Pt is resting in bed comfortably at this time, no apparent distress noted at this time. pt safety maintained. Pt denies any complaints at this time. Pt. pending placement. pt updated on plan of care.

## 2024-01-29 NOTE — ED ADULT NURSE REASSESSMENT NOTE - NS ED NURSE REASSESS COMMENT FT1
Pt is resting in bed comfortably at this time, no apparent distress noted at this time. pt safety maintained. Pt denies any complaints at this time. pt awaiting placement in [facility] at this time. pt updated on plan of care.

## 2024-02-01 NOTE — ED BEHAVIORAL HEALTH ASSESSMENT NOTE - NS ED BHA REVIEW OF ED CHART VITAL SIGNS REVIEWED
Detail Level: Detailed Add 15244 Cpt? (Important Note: In 2017 The Use Of 58440 Is Being Tracked By Cms To Determine Future Global Period Reimbursement For Global Periods): yes Yes

## 2024-03-14 ENCOUNTER — EMERGENCY (EMERGENCY)
Facility: HOSPITAL | Age: 34
LOS: 1 days | Discharge: ANOTHER HOSPITAL | End: 2024-03-14
Attending: EMERGENCY MEDICINE | Admitting: EMERGENCY MEDICINE
Payer: MEDICAID

## 2024-03-14 VITALS
HEIGHT: 73 IN | RESPIRATION RATE: 18 BRPM | SYSTOLIC BLOOD PRESSURE: 151 MMHG | OXYGEN SATURATION: 98 % | HEART RATE: 98 BPM | TEMPERATURE: 98 F | DIASTOLIC BLOOD PRESSURE: 104 MMHG

## 2024-03-14 PROBLEM — F19.10 OTHER PSYCHOACTIVE SUBSTANCE ABUSE, UNCOMPLICATED: Chronic | Status: ACTIVE | Noted: 2024-01-28

## 2024-03-14 LAB
ADD ON TEST-SPECIMEN IN LAB: SIGNIFICANT CHANGE UP
ALBUMIN SERPL ELPH-MCNC: 3.9 G/DL — SIGNIFICANT CHANGE UP (ref 3.3–5)
ALBUMIN SERPL ELPH-MCNC: 4.3 G/DL — SIGNIFICANT CHANGE UP (ref 3.3–5)
ALP SERPL-CCNC: 77 U/L — SIGNIFICANT CHANGE UP (ref 40–120)
ALP SERPL-CCNC: 85 U/L — SIGNIFICANT CHANGE UP (ref 40–120)
ALT FLD-CCNC: 21 U/L — SIGNIFICANT CHANGE UP (ref 4–41)
ALT FLD-CCNC: 25 U/L — SIGNIFICANT CHANGE UP (ref 4–41)
ANION GAP SERPL CALC-SCNC: 13 MMOL/L — SIGNIFICANT CHANGE UP (ref 7–14)
ANION GAP SERPL CALC-SCNC: 15 MMOL/L — HIGH (ref 7–14)
AST SERPL-CCNC: 16 U/L — SIGNIFICANT CHANGE UP (ref 4–40)
AST SERPL-CCNC: 18 U/L — SIGNIFICANT CHANGE UP (ref 4–40)
BASOPHILS # BLD AUTO: 0.04 K/UL — SIGNIFICANT CHANGE UP (ref 0–0.2)
BASOPHILS NFR BLD AUTO: 0.5 % — SIGNIFICANT CHANGE UP (ref 0–2)
BILIRUB DIRECT SERPL-MCNC: <0.2 MG/DL — SIGNIFICANT CHANGE UP (ref 0–0.3)
BILIRUB INDIRECT FLD-MCNC: >0.7 MG/DL — SIGNIFICANT CHANGE UP (ref 0–1)
BILIRUB SERPL-MCNC: 0.7 MG/DL — SIGNIFICANT CHANGE UP (ref 0.2–1.2)
BILIRUB SERPL-MCNC: 0.9 MG/DL — SIGNIFICANT CHANGE UP (ref 0.2–1.2)
BUN SERPL-MCNC: 12 MG/DL — SIGNIFICANT CHANGE UP (ref 7–23)
BUN SERPL-MCNC: 13 MG/DL — SIGNIFICANT CHANGE UP (ref 7–23)
CALCIUM SERPL-MCNC: 9.1 MG/DL — SIGNIFICANT CHANGE UP (ref 8.4–10.5)
CALCIUM SERPL-MCNC: 9.2 MG/DL — SIGNIFICANT CHANGE UP (ref 8.4–10.5)
CHLORIDE SERPL-SCNC: 101 MMOL/L — SIGNIFICANT CHANGE UP (ref 98–107)
CHLORIDE SERPL-SCNC: 99 MMOL/L — SIGNIFICANT CHANGE UP (ref 98–107)
CO2 SERPL-SCNC: 23 MMOL/L — SIGNIFICANT CHANGE UP (ref 22–31)
CO2 SERPL-SCNC: 24 MMOL/L — SIGNIFICANT CHANGE UP (ref 22–31)
CREAT SERPL-MCNC: 0.7 MG/DL — SIGNIFICANT CHANGE UP (ref 0.5–1.3)
CREAT SERPL-MCNC: 0.75 MG/DL — SIGNIFICANT CHANGE UP (ref 0.5–1.3)
EGFR: 121 ML/MIN/1.73M2 — SIGNIFICANT CHANGE UP
EGFR: 124 ML/MIN/1.73M2 — SIGNIFICANT CHANGE UP
EOSINOPHIL # BLD AUTO: 0.14 K/UL — SIGNIFICANT CHANGE UP (ref 0–0.5)
EOSINOPHIL NFR BLD AUTO: 1.6 % — SIGNIFICANT CHANGE UP (ref 0–6)
FLUAV AG NPH QL: SIGNIFICANT CHANGE UP
FLUBV AG NPH QL: SIGNIFICANT CHANGE UP
GLUCOSE SERPL-MCNC: 113 MG/DL — HIGH (ref 70–99)
GLUCOSE SERPL-MCNC: 92 MG/DL — SIGNIFICANT CHANGE UP (ref 70–99)
HCT VFR BLD CALC: 42 % — SIGNIFICANT CHANGE UP (ref 39–50)
HGB BLD-MCNC: 13.9 G/DL — SIGNIFICANT CHANGE UP (ref 13–17)
IANC: 5.22 K/UL — SIGNIFICANT CHANGE UP (ref 1.8–7.4)
IMM GRANULOCYTES NFR BLD AUTO: 0.2 % — SIGNIFICANT CHANGE UP (ref 0–0.9)
LYMPHOCYTES # BLD AUTO: 2.96 K/UL — SIGNIFICANT CHANGE UP (ref 1–3.3)
LYMPHOCYTES # BLD AUTO: 33.4 % — SIGNIFICANT CHANGE UP (ref 13–44)
MAGNESIUM SERPL-MCNC: 2.1 MG/DL — SIGNIFICANT CHANGE UP (ref 1.6–2.6)
MCHC RBC-ENTMCNC: 29.6 PG — SIGNIFICANT CHANGE UP (ref 27–34)
MCHC RBC-ENTMCNC: 33.1 GM/DL — SIGNIFICANT CHANGE UP (ref 32–36)
MCV RBC AUTO: 89.4 FL — SIGNIFICANT CHANGE UP (ref 80–100)
MONOCYTES # BLD AUTO: 0.49 K/UL — SIGNIFICANT CHANGE UP (ref 0–0.9)
MONOCYTES NFR BLD AUTO: 5.5 % — SIGNIFICANT CHANGE UP (ref 2–14)
NEUTROPHILS # BLD AUTO: 5.22 K/UL — SIGNIFICANT CHANGE UP (ref 1.8–7.4)
NEUTROPHILS NFR BLD AUTO: 58.8 % — SIGNIFICANT CHANGE UP (ref 43–77)
NRBC # BLD: 0 /100 WBCS — SIGNIFICANT CHANGE UP (ref 0–0)
NRBC # FLD: 0 K/UL — SIGNIFICANT CHANGE UP (ref 0–0)
PCP SPEC-MCNC: SIGNIFICANT CHANGE UP
PLATELET # BLD AUTO: 328 K/UL — SIGNIFICANT CHANGE UP (ref 150–400)
POTASSIUM SERPL-MCNC: 3.7 MMOL/L — SIGNIFICANT CHANGE UP (ref 3.5–5.3)
POTASSIUM SERPL-MCNC: 3.9 MMOL/L — SIGNIFICANT CHANGE UP (ref 3.5–5.3)
POTASSIUM SERPL-SCNC: 3.7 MMOL/L — SIGNIFICANT CHANGE UP (ref 3.5–5.3)
POTASSIUM SERPL-SCNC: 3.9 MMOL/L — SIGNIFICANT CHANGE UP (ref 3.5–5.3)
PROT SERPL-MCNC: 6.6 G/DL — SIGNIFICANT CHANGE UP (ref 6–8.3)
PROT SERPL-MCNC: 7.3 G/DL — SIGNIFICANT CHANGE UP (ref 6–8.3)
RBC # BLD: 4.7 M/UL — SIGNIFICANT CHANGE UP (ref 4.2–5.8)
RBC # FLD: 12.8 % — SIGNIFICANT CHANGE UP (ref 10.3–14.5)
RSV RNA NPH QL NAA+NON-PROBE: SIGNIFICANT CHANGE UP
SARS-COV-2 RNA SPEC QL NAA+PROBE: SIGNIFICANT CHANGE UP
SODIUM SERPL-SCNC: 137 MMOL/L — SIGNIFICANT CHANGE UP (ref 135–145)
SODIUM SERPL-SCNC: 138 MMOL/L — SIGNIFICANT CHANGE UP (ref 135–145)
WBC # BLD: 8.87 K/UL — SIGNIFICANT CHANGE UP (ref 3.8–10.5)
WBC # FLD AUTO: 8.87 K/UL — SIGNIFICANT CHANGE UP (ref 3.8–10.5)

## 2024-03-14 PROCEDURE — 72020 X-RAY EXAM OF SPINE 1 VIEW: CPT | Mod: 26

## 2024-03-14 PROCEDURE — 99285 EMERGENCY DEPT VISIT HI MDM: CPT | Mod: 25

## 2024-03-14 PROCEDURE — 93010 ELECTROCARDIOGRAM REPORT: CPT

## 2024-03-14 RX ORDER — SODIUM CHLORIDE 9 MG/ML
1000 INJECTION, SOLUTION INTRAVENOUS ONCE
Refills: 0 | Status: COMPLETED | OUTPATIENT
Start: 2024-03-14 | End: 2024-03-14

## 2024-03-14 RX ORDER — ACETAMINOPHEN 500 MG
975 TABLET ORAL ONCE
Refills: 0 | Status: COMPLETED | OUTPATIENT
Start: 2024-03-14 | End: 2024-03-14

## 2024-03-14 RX ORDER — DIAZEPAM 5 MG
5 TABLET ORAL ONCE
Refills: 0 | Status: DISCONTINUED | OUTPATIENT
Start: 2024-03-14 | End: 2024-03-14

## 2024-03-14 RX ORDER — LIDOCAINE 4 G/100G
1 CREAM TOPICAL ONCE
Refills: 0 | Status: COMPLETED | OUTPATIENT
Start: 2024-03-14 | End: 2024-03-14

## 2024-03-14 RX ADMIN — Medication 5 MILLIGRAM(S): at 04:48

## 2024-03-14 RX ADMIN — Medication 975 MILLIGRAM(S): at 23:08

## 2024-03-14 RX ADMIN — Medication 5 MILLIGRAM(S): at 23:08

## 2024-03-14 RX ADMIN — Medication 975 MILLIGRAM(S): at 15:40

## 2024-03-14 RX ADMIN — LIDOCAINE 1 PATCH: 4 CREAM TOPICAL at 04:47

## 2024-03-14 RX ADMIN — Medication 975 MILLIGRAM(S): at 04:46

## 2024-03-14 RX ADMIN — SODIUM CHLORIDE 1000 MILLILITER(S): 9 INJECTION, SOLUTION INTRAVENOUS at 04:50

## 2024-03-14 RX ADMIN — LIDOCAINE 1 PATCH: 4 CREAM TOPICAL at 16:47

## 2024-03-14 RX ADMIN — Medication 975 MILLIGRAM(S): at 16:10

## 2024-03-14 NOTE — ED PROVIDER NOTE - PROGRESS NOTE DETAILS
Mateusz: EKG largely unchanged since 1/29/24. Lab results unremarkable. Describes months of thoracic back pain across the entire (L-to-R) of thorax. No trauma/F/IVDA. No neuro deficits or bowel/bladder incontinence. No cancer/ Therefore, not likely epidural abscess, traumatic fracture, osteomyelitis, or spinal cord compression. Serum WBC unremarkable. Will check ESR/CRP and x-ray back. If unremarkable, then, per [facility]'s Kaci ([phone]), will call Health system's Detox Unit Dr. Robles at [phone] (dial "[phone]") and ask for on-call Dr. Robles to tell him patient uses multiple drugs (ETOH, heroin, crack cocaine), is COVID neg., and has CIWA = 0. Accepted by Dr. Herrera, crp <3, xray wnl. [place], PGY3 - Received limited signout on patient.  Patient PMH polysubstance abuse, was transferred due to back pain with no red flag symptoms.  Patient was given valium recently, awaiting pickup. [place], PGY3 - Patient requesting additional back pain medication, something for anxiety.  Will give Valium 5 mg.  Of note there was an incorrect order placed at 430am by another physician for Valium that was not given.

## 2024-03-14 NOTE — PROVIDER CONTACT NOTE (OTHER) - ASSESSMENT
Pt is being accepted to [address] [address] [phone], accepted by.Dr. Robles, NOEL spoke with Mirna RAMIREZ at [phone], Ext: [phone], faxed all needed documents to [phone], confirmed she received. NOEL arranged [facility] [phone]. Trip# [id number] for 3/15/24. P/U 8 AM. [facility] Auth # [id number].
Called [facility] admission-per admissions, they will assess for admission pending bed availability - at this time they have admissions scheduled for today, however if someone does not show up, they can review pt for detox services at [facility].

## 2024-03-14 NOTE — ED PROVIDER NOTE - ATTENDING CONTRIBUTION TO CARE
Marques: I have seen and examined the patient face to face, have reviewed and addended the HPI, PE and a/p as necessary.     34 yo M with polysubstance abuse, hypertension not on medications a/w back pain.  Reports mid to low back pain x 3-4 days, worsens with movement.  Noted to have history of back pain over the last few years. Patient not taking any medications for the pain. No fevers, no chills, no recent spinal procedures, no bowel/bladder incontinence, no h/o cancer, no recent weight loss, no trauma, no weakness no numbness, no tingling, no dysuria, no hematuria.  Patient reports he has been using crack cocaine, xanax last used at 1:30 am. Patient requesting detox. Denies chest pain, difficulty breathing, nausea, vomiting.    GEN - NAD; A+O x3; psychomotor agitation  CARD -s1s2, RRR, no M,G,R;   PULM - CTA b/l, symmetric breath sounds;   ABD -  +BS, ND, NT, soft, no guarding, no rebound, no masses;   BACK - no CVA tenderness, Normal  spine; TTP bilateral paraspinally.  EXT - symmetric pulses, 2+ dp, capillary refill < 2 seconds, no cyanosis, no edema;   NEURO - no focal neuro deficits, no slurred speech    Will treat msk back pain, no warning features for back pain, with no fevers, no chills, no recent spinal procedures, no bowel/bladder incontinence, no h/o cancer, no recent weight loss, no trauma, no weakness no numbness, no tingling, no dysuria, no hematuria.  Will treat with valium, pain meds, reassess.  Noted to have polysubstance abuse with benzos, opioids/heroin and cocaine, concern for possible withdrawal, valium will also treat (in addition to back pain).  Will consider possible detox.  Follow up with sbirt and SW for possible bed at [facility].

## 2024-03-14 NOTE — ED ADULT NURSE NOTE - NSFALLUNIVINTERV_ED_ALL_ED
Bed/Stretcher in lowest position, wheels locked, appropriate side rails in place/Call bell, personal items and telephone in reach/Instruct patient to call for assistance before getting out of bed/chair/stretcher/Non-slip footwear applied when patient is off stretcher/[place] to call system/Physically safe environment - no spills, clutter or unnecessary equipment/Purposeful proactive rounding/Room/bathroom lighting operational, light cord in reach

## 2024-03-14 NOTE — ED ADULT NURSE REASSESSMENT NOTE - NS ED NURSE REASSESS COMMENT FT1
Received report from ASHLEY Clark. Pt is A&Ox4, resting in stretcher with complaints of back pain at this time. Respirations even and unlabored, chest rise equal b/l. VS as noted in flow sheets. Pt denies chest pain, SOB, abdominal pain, N/V/D, h/a, dizziness, numbness/tingling or any urinary symptoms at this time. Medication administered as ordered, see MAR. No acute distress noted. Safety maintained throughout.

## 2024-03-14 NOTE — ED PROVIDER NOTE - OBJECTIVE STATEMENT
33-year-old male with past medical history of polysubstance abuse, hypertension not on medications presenting with back pain. Patient states he has had progressively worsening back pain over the last couple of days. Patient has a history of back pain over the last few years. Patient not taking any medications for the pain. denies lower extremity weakness, radiation of pain, bowel or bladder incontinence, paresthesias, injection drug use, injections into the back. Patient also states he has been using crack cocaine, last used at 1:30 am. Patient requesting detox. Denies chest pain, difficulty breathing, nausea, vomiting.

## 2024-03-14 NOTE — ED ADULT NURSE REASSESSMENT NOTE - NS ED NURSE REASSESS COMMENT FT1
A&Ox4. C/O blurry vision. bilateral scleras are red in color. MD Lewis made aware. meal provided. NAD. pt denies SOB, chest pain, dizziness, weakness, urinary symptoms, HA, n/v/d, fevers, chills, pain, SI, HI, hallucinations. respirations are even and un labored. safety precautions maintained.

## 2024-03-14 NOTE — ED ADULT NURSE NOTE - OBJECTIVE STATEMENT
Pt A&Ox4 ambulatory at baseline, PMH polysubstance abuse, hypertension not on medications, presenting to the ED (RM 3A) c/o mid upper back pain. Pt states has been endorsing symptom for a couple of years. Denies any falls or injuries. Pt also endorses smoking crack cocaine approx 2 am. Pt also endorses drinking, cannot recall if drank alcohol today. Pt denies past alcohol withdrawals symptoms or seizures. Respirations are even and unlabored, NAD, denies any other complaints. 20G IV LAC, labs sent, medicated as per orders, Safety precautions implemented as per protocol, awaiting further MD orders, plan of care ongoing.

## 2024-03-14 NOTE — ED PROVIDER NOTE - PHYSICAL EXAMINATION
General: Appears well and nontoxic  Mentation: AAO x 3  psych: mood appropriate  HEENT: airway patent, conjunctivae clear bilaterally  Resp: symmetric chest rise, no resp distress, breath sounds CTA bilaterally  Cardio: RRR, no m/r/g  GI: soft/nondistended/nontender  Neuro: sensation and motor function grossly intact, 5/5 muscle strength bilaterally in both UEX and MENDEZ  Skin: no cyanosis, no jaundice  MSK: normal movement of all extremities  Lymph/Vasc: no LE edema

## 2024-03-14 NOTE — ED ADULT NURSE REASSESSMENT NOTE - NS ED NURSE REASSESS COMMENT FT1
report received from overnight RN . A&Ox4. NAD. pt denies SOB, chest pain, dizziness, weakness, urinary symptoms, HA, n/v/d, fevers, chills, pain. NAD. respirations are even and un labored. safety precautions maintained.

## 2024-03-14 NOTE — SBIRT NOTE ADULT - NSSBIRTALCACTIVEREFTXDET_GEN_A_CORE
.Provided SBIRT services: Full Screen Positive. Brief Intervention and Referral to Treatment Performed.  Screening results were reviewed with the patient and patient was provided information about healthy guidelines and   potential negative consequences associated with level of risk. Motivation and readiness to reduce or stop use was   discussed and goals and activities to make changes were suggested/offered. Provided SBIRT services: Full Screen Positive. Brief Intervention and Referral to Treatment Performed.  Screening results were reviewed with the patient and patient was provided information about healthy guidelines and   potential negative consequences associated with level of risk. Motivation and readiness to reduce or stop use was   discussed and goals and activities to make changes were suggested/offered.

## 2024-03-14 NOTE — ED ADULT TRIAGE NOTE - CHIEF COMPLAINT QUOTE
Pt c/o back pain. Also requesting drug (cocaine) detox. Last reported use today. Pt also reports ETOH use, unsure if he drank today but reports drinking almost everyday. Denies SI/HI, AH/VH. Hx HTN, polysubstance abuse

## 2024-03-14 NOTE — ED ADULT NURSE REASSESSMENT NOTE - NS ED NURSE REASSESS COMMENT FT1
Report received from ASHLEY Clements. Pt appears resting comfortably in stretcher. Respirations even and unlabored with equal chest rise. No acute distress noted. Bed in lowest position, in vision of nurses station in spot 3A, wheels locked, safety maintained. Awaiting social work for transfer.

## 2024-03-14 NOTE — ED PROVIDER NOTE - CLINICAL SUMMARY MEDICAL DECISION MAKING FREE TEXT BOX
33-year-old male with past medical history of polysubstance abuse, hypertension not on medications presenting with back pain. Patient states he has had progressively worsening back pain over the last couple of days. Patient has a history of back pain over the last few years. Patient not taking any medications for the pain. denies lower extremity weakness, radiation of pain, bowel or bladder incontinence, paresthesias, injection drug use, injections into the back. Patient also states he has been using crack cocaine, last used at 1:30 am. Patient requesting detox. Denies chest pain, difficulty breathing, nausea, vomiting. Labs, IV fluids, pain control.

## 2024-03-14 NOTE — ED ADULT NURSE NOTE - NS_SISCREENINGSR_GEN_ALL_ED
Procedure:    Office Based Direct Laryngoscopy with ablation of lesion via pulsed KTP laser, CPT 51704  Office Based Direct Laryngoscopy with therapeutic injection of the vocal cords, CPT 81685  Location:  Bilateral    Diagnosis: RRP    Description of Procedure:  Informed consent was reviewed and signed.  A 4% inhaled lidocaine treatment was given.  After adequate afrin and lidocaine spray, I advanced the channeled endosocpe.  A topical lidocaine gargle was applied to the vocal folds using 4% lidocaine via the channeled scope.      The laser was utilized at   28   pradhan,  15 msec pulse mode, 2 pps to ablate the lesions at the following settings and locations:  Bilateral medial vocal cords in a cautery mode to avoid scarring  Total of 223 J  Injection via the thyrohyoid membrane into the superficial lamina propria . 25 mg of Avastin in a divided manner. 75 mg wasted.   The patient tolerated the procedure well.    WHAT DO I NEED TO KNOW AFTER MY TREATMENT?  · Do not eat or drink anything for 1 hour after your KTP laser treatment. This gives the numbing medicine time to wear off, so you can swallow safely.  · Do not clear your throat or cough unless needed. Your doctor may also place you on voice rest. Follow your doctor's orders about how to limit the use of voice.   · Due to swelling, your voice may come and go and it may not be as clear for a couple of weeks after your procedure.   · If you have a mild sore throat after your KTP laser treatment, you can take acetaminophen (Tylenol) as directed on the bottle. If this does not help your pain, you can take      ibuprofen (Advil) as directed on the bottle.    WHO CAN I CALL IF I HAVE QUESTIONS?   For general questions: Contact our  at [phone], press 2  For medical questions about your procedure call our nurse at [phone].     Negative

## 2024-03-14 NOTE — ED ADULT NURSE REASSESSMENT NOTE - NS ED NURSE REASSESS COMMENT FT1
Pt awake, c/o back pain. Requesting pain medication and antianxiety medication. MD Nails made aware and patient medicated as ordered. Pt well appearing sitting up in stretcher eating meal. Airway patent, speaking in full and complete sentences. No acute distress noted. Denies CP, SOB, nausea, vomiting, headache, lightheadedness, dizziness, fever, chills, SI/HI, hallucinations. Safety maintained. As per Earl, transportation arriving approximately 0800 tomorrow morning to transfer to [facility]. Awaiting further orders. Pt awake, c/o back pain. Requesting pain medication and antianxiety medication. MD Nails made aware and patient medicated as ordered. Pt well appearing sitting up in stretcher eating meal. Airway patent, speaking in full and complete sentences. No acute distress noted. Denies CP, SOB, nausea, vomiting, headache, lightheadedness, dizziness, fever, chills, SI/HI, hallucinations. Safety maintained. As per Earl, transportation arriving approximately 0800 tomorrow morning to transfer to [facility], patient made aware. Awaiting further orders.

## 2024-03-15 VITALS
SYSTOLIC BLOOD PRESSURE: 126 MMHG | TEMPERATURE: 98 F | DIASTOLIC BLOOD PRESSURE: 85 MMHG | OXYGEN SATURATION: 98 % | RESPIRATION RATE: 17 BRPM | HEART RATE: 70 BPM

## 2024-03-15 LAB
ANION GAP SERPL CALC-SCNC: 10 MMOL/L — SIGNIFICANT CHANGE UP (ref 7–14)
BUN SERPL-MCNC: 14 MG/DL — SIGNIFICANT CHANGE UP (ref 7–23)
CALCIUM SERPL-MCNC: 9.3 MG/DL — SIGNIFICANT CHANGE UP (ref 8.4–10.5)
CHLORIDE SERPL-SCNC: 102 MMOL/L — SIGNIFICANT CHANGE UP (ref 98–107)
CO2 SERPL-SCNC: 26 MMOL/L — SIGNIFICANT CHANGE UP (ref 22–31)
CREAT SERPL-MCNC: 0.83 MG/DL — SIGNIFICANT CHANGE UP (ref 0.5–1.3)
EGFR: 118 ML/MIN/1.73M2 — SIGNIFICANT CHANGE UP
GLUCOSE SERPL-MCNC: 116 MG/DL — HIGH (ref 70–99)
MAGNESIUM SERPL-MCNC: 2.2 MG/DL — SIGNIFICANT CHANGE UP (ref 1.6–2.6)
POTASSIUM SERPL-MCNC: 4.3 MMOL/L — SIGNIFICANT CHANGE UP (ref 3.5–5.3)
POTASSIUM SERPL-SCNC: 4.3 MMOL/L — SIGNIFICANT CHANGE UP (ref 3.5–5.3)
SODIUM SERPL-SCNC: 138 MMOL/L — SIGNIFICANT CHANGE UP (ref 135–145)

## 2024-03-15 RX ORDER — DIAZEPAM 5 MG
5 TABLET ORAL ONCE
Refills: 0 | Status: DISCONTINUED | OUTPATIENT
Start: 2024-03-15 | End: 2024-03-15

## 2024-03-15 RX ADMIN — Medication 5 MILLIGRAM(S): at 05:37

## 2024-03-15 NOTE — ED ADULT NURSE REASSESSMENT NOTE - PAIN RATING/NUMBER SCALE (0-10): ACTIVITY
0 (no pain/absence of nonverbal indicators of pain)
0 (no pain/absence of nonverbal indicators of pain)
6 (moderate pain)
6 (moderate pain)

## 2024-03-15 NOTE — ED ADULT NURSE REASSESSMENT NOTE - NS ED NURSE REASSESS COMMENT FT1
[facility] called for report on patient. Report endorsed, states will arrive for pickup around 0800 this morning. Safety maintained. Awaiting transportation arrival.

## 2024-03-15 NOTE — ED ADULT NURSE REASSESSMENT NOTE - NS ED NURSE REASSESS COMMENT FT1
ER report taking from night shift pt [facility] transp. presently assessing the pt.   pt had all his belong done by previous RN, pt tranf. to rehab. center.    Report to the facility was given by prior RN.  Annmarie Drummond RN

## 2024-03-15 NOTE — ED ADULT NURSE REASSESSMENT NOTE - NS ED NURSE REASSESS COMMENT FT1
Pt appears comfortably resting in stretcher. Respirations even and unlabored with equal chest rise. No acute distress noted. Safety maintained. Awaiting transportation for transfer.

## 2024-03-15 NOTE — ED ADULT NURSE REASSESSMENT NOTE - NS ED NURSE REASSESS COMMENT FT1
As per MD Mathew, okay to return patient's valuables and belongings to patient. ED tech providing items to patient.

## 2024-03-15 NOTE — ED ADULT NURSE REASSESSMENT NOTE - AS PAIN REST
6 (moderate pain)
6 (moderate pain)
0 (no pain/absence of nonverbal indicators of pain)
0 (no pain/absence of nonverbal indicators of pain)

## 2024-03-24 ENCOUNTER — EMERGENCY (EMERGENCY)
Facility: HOSPITAL | Age: 34
LOS: 1 days | Discharge: HOME | End: 2024-03-24
Attending: EMERGENCY MEDICINE
Payer: COMMERCIAL

## 2024-03-24 VITALS
HEIGHT: 74 IN | HEART RATE: 78 BPM | RESPIRATION RATE: 18 BRPM | SYSTOLIC BLOOD PRESSURE: 168 MMHG | OXYGEN SATURATION: 97 % | DIASTOLIC BLOOD PRESSURE: 100 MMHG | TEMPERATURE: 98 F | WEIGHT: 203.49 LBS

## 2024-03-24 PROCEDURE — 99283 EMERGENCY DEPT VISIT LOW MDM: CPT

## 2024-03-24 RX ORDER — BUPRENORPHINE AND NALOXONE 2; .5 MG/1; MG/1
16 TABLET SUBLINGUAL DAILY
Refills: 0 | Status: DISCONTINUED | OUTPATIENT
Start: 2024-03-24 | End: 2024-03-24

## 2024-03-24 RX ORDER — BUPRENORPHINE AND NALOXONE 2; .5 MG/1; MG/1
2 TABLET SUBLINGUAL DAILY
Refills: 0 | Status: COMPLETED | OUTPATIENT
Start: 2024-03-24 | End: 2024-03-31

## 2024-03-24 RX ADMIN — BUPRENORPHINE AND NALOXONE 2 FILM(S): 2; .5 TABLET SUBLINGUAL at 16:08

## 2024-03-24 NOTE — ED ADULT TRIAGE NOTE - CHIEF COMPLAINT QUOTE
pt states he needs Suboxone, hasn't had since 8am yesterday, missed 2 doses, c/o left arm pain, denies CP  A&Ox3, resp wnl, c/o nervous, states maybe in withdrawal, slight tremors

## 2024-03-24 NOTE — ED PROVIDER NOTE - OBJECTIVE STATEMENT
32-year-old male presents to ED for Suboxone dosing.  Patient explained that he was just discharged out of [facility] where he was on Suboxone and has a 2-week Suboxone prescription sent to the pharmacy.  Patient stated he is unable to pick it up because it has been a glitch in the system [place] and he came to the ED here for dosing.  Patient denies any shortness of breath,, chest pain, dizziness or irritability.  Patient denies any issue at this time.

## 2024-03-24 NOTE — ED PROVIDER NOTE - PATIENT PORTAL LINK FT
You can access the FollowMyHealth Patient Portal offered by [facility] by registering at the following website: [email or web address]. By joining [employer]’s FollowMyHealth portal, you will also be able to view your health information using other applications (apps) compatible with our system.

## 2024-03-24 NOTE — ED PROVIDER NOTE - ATTENDING APP SHARED VISIT CONTRIBUTION OF CARE
I, Juan Francisco Chino, performed the initial face to face bedside interview with this patient regarding history of present illness, review of symptoms and relevant past medical, social and family history.  I completed an independent physical examination.  I was the initial provider who evaluated this patient. I have signed out the follow up of any pending tests (i.e. labs, radiological studies) to the ACP.  I have communicated the patient’s plan of care and disposition with the ACP.

## 2024-03-24 NOTE — ED PROVIDER NOTE - CLINICAL SUMMARY MEDICAL DECISION MAKING FREE TEXT BOX
32-year-old male presents to ED for Suboxone dosing.  Patient explained that he was just discharged out of [facility] where he was on Suboxone and has a 2-week Suboxone prescription sent to the pharmacy.  Patient stated he is unable to pick it up because it has been a glitch in the system [place] and he came to the ED here for dosing.  HEENT: Normal findings, Eyes : PERRLA, EOMI , Nares clear and Throat : WNL  Lungs: Clear B/L with good air entry  CVS: S1-S2 , with no murmurs  Abd : Normal BS, with no tenderness or organomegaly  Ext: Normal findings

## 2024-03-24 NOTE — ED ADULT NURSE NOTE - OBJECTIVE STATEMENT
Pt states he has not taken a dose of Suboxone since 8 am yesterday and feel like he is going through withdrawal. Pt denies fevers, chills, cp, sob, N/V/D. States he wants a med refill

## 2024-03-24 NOTE — ED PROVIDER NOTE - PROGRESS NOTE DETAILS
Patient Suboxone dose confirmed by nurse ASHLEY Hansen at [facility].  She confirmed that patient is currently on appropriate dose dosing and tomorrow morning's physicians will send medication to pharmacy for patient.  So patient only needs 2 days dosed

## 2024-05-28 NOTE — ED ADULT NURSE NOTE - COVID-19 ORDERING FACILITY
Alert-The patient is alert, awake and responds to voice. The patient is oriented to time, place, and person. The triage nurse is able to obtain subjective information. AURA/TERRY/Wen No

## 2024-06-01 ENCOUNTER — EMERGENCY (EMERGENCY)
Facility: HOSPITAL | Age: 34
LOS: 1 days | Discharge: HOME | End: 2024-06-01
Attending: STUDENT IN AN ORGANIZED HEALTH CARE EDUCATION/TRAINING PROGRAM | Admitting: STUDENT IN AN ORGANIZED HEALTH CARE EDUCATION/TRAINING PROGRAM
Payer: MEDICAID

## 2024-06-01 VITALS
DIASTOLIC BLOOD PRESSURE: 110 MMHG | OXYGEN SATURATION: 97 % | TEMPERATURE: 99 F | RESPIRATION RATE: 18 BRPM | HEART RATE: 104 BPM | SYSTOLIC BLOOD PRESSURE: 171 MMHG

## 2024-06-01 PROCEDURE — 99283 EMERGENCY DEPT VISIT LOW MDM: CPT | Mod: 25

## 2024-06-01 NOTE — ED ADULT TRIAGE NOTE - CHIEF COMPLAINT QUOTE
Pt st" I cant sleep have not slept in 5 days....I have been drinking a lot....I drank today liquor and 5 tall beers last drink was around 10pm.also used cocaine today ...I don't want detox I feel dehyrdrated and just want to sleep. " pt is calm, aox4, speech is clear. 171denies depression .  Hx htn. Pt st" I cant sleep have not slept in 5 days....I have been drinking a lot....I drank today some liquor and 5 tall beers last drink was around 10pm I also used cocaine today ...I don't want detox I just feel dehydrated and just want to sleep. "pt diaphoretic.  pt is calm, aox4, speech is clear. denies depression .  Hx htn.

## 2024-06-02 PROCEDURE — 93010 ELECTROCARDIOGRAM REPORT: CPT

## 2024-06-02 RX ORDER — ACETAMINOPHEN 500 MG
975 TABLET ORAL ONCE
Refills: 0 | Status: COMPLETED | OUTPATIENT
Start: 2024-06-02 | End: 2024-06-02

## 2024-06-02 RX ORDER — LIDOCAINE 4 G/100G
1 CREAM TOPICAL ONCE
Refills: 0 | Status: COMPLETED | OUTPATIENT
Start: 2024-06-02 | End: 2024-06-02

## 2024-06-02 RX ORDER — HYDROXYZINE HCL 10 MG
25 TABLET ORAL ONCE
Refills: 0 | Status: COMPLETED | OUTPATIENT
Start: 2024-06-02 | End: 2024-06-02

## 2024-06-02 RX ADMIN — LIDOCAINE 1 PATCH: 4 CREAM TOPICAL at 01:03

## 2024-06-02 RX ADMIN — Medication 25 MILLIGRAM(S): at 01:03

## 2024-06-02 RX ADMIN — Medication 975 MILLIGRAM(S): at 01:03

## 2024-06-02 NOTE — ED PROVIDER NOTE - CLINICAL SUMMARY MEDICAL DECISION MAKING FREE TEXT BOX
33-year-old male with past medical history of polysubstance abuse, hypertension not on medications presenting with insomnia. Differential diagnosis includes but is not limited to chronic pain, insomnia due to stimulant use, withdrawal (unlikely, no tremulousness), anxiety. Pt needs to see crisis center. no acute psychiatric needs, would give hydroxyzine (he isn't driving) and lidocaine patch, tylenol for shoulder pain. dc w/ pmd /crisis f/u.

## 2024-06-02 NOTE — ED ADULT NURSE NOTE - CHIEF COMPLAINT QUOTE
Pt st" I cant sleep have not slept in 5 days....I have been drinking a lot....I drank today some liquor and 5 tall beers last drink was around 10pm I also used cocaine today ...I don't want detox I just feel dehydrated and just want to sleep. "pt diaphoretic.  pt is calm, aox4, speech is clear. denies depression .  Hx htn.

## 2024-06-02 NOTE — ED PROVIDER NOTE - NSFOLLOWUPINSTRUCTIONS_ED_ALL_ED_FT
Please follow up with your primary care physician within 2-3 days.   Return to the ER for any new or concerning symptoms.   You may take 975 mg acetaminophen every 6 hours as needed for pain.    [facility]  [address] [place] [id number] [phone] Please follow up with your primary care physician within 2-3 days.   Return to the ER for any new or concerning symptoms.   You may take 975 mg acetaminophen every 6 hours as needed for pain.    [facility]  [address] [place] [id number] [phone]    If you have any severe increase in pain, fever, uncontrollable nausea/vomiting, or inability to tolerate eating and drinking you need to come back to the emergency room.

## 2024-06-02 NOTE — ED PROVIDER NOTE - ATTENDING CONTRIBUTION TO CARE
33M h/o polysubstance abuse on suboxone, HTN p/w insomnia. Reports has been drinking 4 "tall boy" beers daily but no other substance use. Reports pain to left shoulder blade but denies chest pain, sob, difficulty breathing, falls/trauma. Has not taken anything for pain at home.   Gen: calm and cooperative  CV: rrr, no appreciable murmur  Pulm: clear lungs  Abd: soft, ntnd  Ext/MSK: no edema/swelling; FROM of b/l upper extremities, no point tenderness or evidence of trauma  Skin: no rash/petechiae/ecchymosis/abrasions  MDM: 33M h/o polysubstance abuse on suboxone, HTN p/w insomnia. No SI/HI, auditory/visual hallucinations. Recommended benadryl vs hydroxyzine available over the counter. Analgesia for shoulder pain. BP noted - pt with known HTN but does not take medications. Pt is calm and cooperative, has some shaking of legs but reports c/w his restless leg syndrome. No clinical signs of withdrawal. Crisis center referral information provided.

## 2024-06-02 NOTE — ED ADULT NURSE NOTE - OBJECTIVE STATEMENT
Received pt in 12B, pt is A&Ox4 with past medical history of HTN. Pt came to the ED due to not being able sleep for the past 5 days. Pt reports that he also drinks daily about 5 tall beers and last drink was around 2200 on 6/1/24. pt also reports he took some Cocaine today. Pt is calm and cooperative. Pt breathing is equal and nonlabored. Pt denies any chest pain, SOB, nausea, vomiting, diarrhea, fever or chills. MD Shepard stated pt can have his belongings back and PCA went to get it. pt safety maintained.

## 2024-06-02 NOTE — ED PROVIDER NOTE - PATIENT PORTAL LINK FT
You can access the FollowMyHealth Patient Portal offered by [facility] by registering at the following website: [email or web address]. By joining [employer]’s FollowMyHealth portal, you will also be able to view your health information using other applications (apps) compatible with our system.

## 2024-06-02 NOTE — ED ADULT NURSE REASSESSMENT NOTE - NS ED NURSE REASSESS COMMENT FT1
Pt undressed and belongings secured, sent to security and clothing placed across 21. Pt calm and cooperative at this time, resting in stretcher, RR even and unlabored, NAD noted.

## 2024-06-02 NOTE — ED PROVIDER NOTE - OBJECTIVE STATEMENT
33-year-old male with past medical history of polysubstance abuse, hypertension not on medications presenting with insomnia. Pt takes suboxone, took his dose today. No chest pain, trouble breathing, N/V/D, cough congestion, fever, chills, falls or trauma. No SI/HI. Pt did not try anything for his pain. some atraumatic L scapular pain, no rashes.

## 2024-06-02 NOTE — ED PROVIDER NOTE - PHYSICAL EXAMINATION
General: Well-appearing, NAD  Head: Atraumatic, normocephalic  Eyes: EOM grossly in tact, no scleral icterus  ENT: moist mucous membranes  Neurology: A&Ox 3 , nonfocal, CHARLES x 4  Respiratory: normal respiratory effort  CV: Extremities warm and well perfused  Abdominal: Soft, non-distended  Extremities: No edema  Skin: No rashes, no skin changes near L scapula

## 2024-08-03 NOTE — ED ADULT TRIAGE NOTE - TEMPERATURE IN FAHRENHEIT (DEGREES F)
Apixaban/Eliquis increases your risk for bleeding. Notify your doctor if you experience any of the following side effects: bleeding, coughing or vomiting blood, red or black stool, unexpected pain or swelling, itching or hives, chest pain, chest tightness, trouble breathing, changes in how much or how often you urinate, red or pink urine, numbness or tingling in your feet, or unusual muscle weakness. When Apixaban/Eliquis is taken with other medicines, they can affect how it works. Taking other medications such as aspirin, blood thinners, nonsteroidal anti-inflammatories, and medications that treat depression can increase your risk of bleeding. It is very important to tell your health care provider about all of the other medicines, including over-the-counter medications, herbs, and vitamins you are taking. DO NOT start, stop, or change the dosage of any medicine, including over-the-counter medicines, vitamins, and herbal products without your doctor’s approval. Any products containing aspirin or are nonsteroidal anti-inflammatories lessen the blood’s ability to form clots and add to the effect of Apixaban/Eliquis. Never take aspirin or medicines that contain aspirin without speaking to your doctor. 98.2

## 2024-09-04 NOTE — ED ADULT NURSE NOTE - NSFALLOOBATTEMPT_ED_ALL_ED
Medication: metFORMIN (GLUCOPHAGE) 850 MG tablet, glimepiride (AMARYL) 4 MG tablet, NovoLOG FlexPen 100 UNIT/ML pen-injector   Last office visit date: 07/10/24  Medication Refill Protocol Failed.  Medication Refill Protocol Failed. Courtesy Refill provided after performing review of chart per protocol guidelines. Writer confirmed, courtesy refill was not a duplicate.       Medication: amLODIPine (NORVASC) 5 MG tablet , lovastatin (MEVACOR) 40 MG tablet, omeprazole (PrilOSEC) 20 MG capsule, B-D U/F PEN NEEDLE 31G X 5 MM Misc  passed protocol.   Last office visit date: 07/10/24  Next appointment scheduled?: No; Outreach performed, left message for patient to call back.    Number of refills given: 0    No

## 2024-11-02 ENCOUNTER — INPATIENT (INPATIENT)
Facility: HOSPITAL | Age: 34
LOS: 3 days | Discharge: HOME | DRG: 897 | End: 2024-11-06
Attending: STUDENT IN AN ORGANIZED HEALTH CARE EDUCATION/TRAINING PROGRAM | Admitting: STUDENT IN AN ORGANIZED HEALTH CARE EDUCATION/TRAINING PROGRAM
Payer: MEDICAID

## 2024-11-02 VITALS
RESPIRATION RATE: 18 BRPM | HEIGHT: 74 IN | HEART RATE: 109 BPM | OXYGEN SATURATION: 99 % | TEMPERATURE: 99 F | DIASTOLIC BLOOD PRESSURE: 95 MMHG | SYSTOLIC BLOOD PRESSURE: 146 MMHG | WEIGHT: 190.48 LBS

## 2024-11-02 DIAGNOSIS — F10.939 ALCOHOL USE, UNSPECIFIED WITH WITHDRAWAL, UNSPECIFIED: ICD-10-CM

## 2024-11-02 LAB
ALBUMIN SERPL ELPH-MCNC: 4.4 G/DL — SIGNIFICANT CHANGE UP (ref 3.5–5)
ALP SERPL-CCNC: 84 U/L — SIGNIFICANT CHANGE UP (ref 40–120)
ALT FLD-CCNC: 32 U/L DA — SIGNIFICANT CHANGE UP (ref 10–60)
ANION GAP SERPL CALC-SCNC: 5 MMOL/L — SIGNIFICANT CHANGE UP (ref 5–17)
AST SERPL-CCNC: 18 U/L — SIGNIFICANT CHANGE UP (ref 10–40)
BASOPHILS # BLD AUTO: 0.04 K/UL — SIGNIFICANT CHANGE UP (ref 0–0.2)
BASOPHILS NFR BLD AUTO: 0.5 % — SIGNIFICANT CHANGE UP (ref 0–2)
BILIRUB SERPL-MCNC: 0.7 MG/DL — SIGNIFICANT CHANGE UP (ref 0.2–1.2)
BUN SERPL-MCNC: 14 MG/DL — SIGNIFICANT CHANGE UP (ref 7–18)
CALCIUM SERPL-MCNC: 9.3 MG/DL — SIGNIFICANT CHANGE UP (ref 8.4–10.5)
CHLORIDE SERPL-SCNC: 105 MMOL/L — SIGNIFICANT CHANGE UP (ref 96–108)
CO2 SERPL-SCNC: 31 MMOL/L — SIGNIFICANT CHANGE UP (ref 22–31)
CREAT SERPL-MCNC: 0.96 MG/DL — SIGNIFICANT CHANGE UP (ref 0.5–1.3)
EGFR: 106 ML/MIN/1.73M2 — SIGNIFICANT CHANGE UP
EOSINOPHIL # BLD AUTO: 0.29 K/UL — SIGNIFICANT CHANGE UP (ref 0–0.5)
EOSINOPHIL NFR BLD AUTO: 3.7 % — SIGNIFICANT CHANGE UP (ref 0–6)
ETHANOL SERPL-MCNC: <3 MG/DL — SIGNIFICANT CHANGE UP (ref 0–10)
GLUCOSE SERPL-MCNC: 96 MG/DL — SIGNIFICANT CHANGE UP (ref 70–99)
HCT VFR BLD CALC: 50.1 % — HIGH (ref 39–50)
HGB BLD-MCNC: 17.3 G/DL — HIGH (ref 13–17)
IMM GRANULOCYTES NFR BLD AUTO: 0.1 % — SIGNIFICANT CHANGE UP (ref 0–0.9)
LYMPHOCYTES # BLD AUTO: 2.44 K/UL — SIGNIFICANT CHANGE UP (ref 1–3.3)
LYMPHOCYTES # BLD AUTO: 31.5 % — SIGNIFICANT CHANGE UP (ref 13–44)
MAGNESIUM SERPL-MCNC: 2.1 MG/DL — SIGNIFICANT CHANGE UP (ref 1.6–2.6)
MCHC RBC-ENTMCNC: 30.5 PG — SIGNIFICANT CHANGE UP (ref 27–34)
MCHC RBC-ENTMCNC: 34.5 G/DL — SIGNIFICANT CHANGE UP (ref 32–36)
MCV RBC AUTO: 88.2 FL — SIGNIFICANT CHANGE UP (ref 80–100)
MONOCYTES # BLD AUTO: 0.52 K/UL — SIGNIFICANT CHANGE UP (ref 0–0.9)
MONOCYTES NFR BLD AUTO: 6.7 % — SIGNIFICANT CHANGE UP (ref 2–14)
NEUTROPHILS # BLD AUTO: 4.45 K/UL — SIGNIFICANT CHANGE UP (ref 1.8–7.4)
NEUTROPHILS NFR BLD AUTO: 57.5 % — SIGNIFICANT CHANGE UP (ref 43–77)
NRBC # BLD: 0 /100 WBCS — SIGNIFICANT CHANGE UP (ref 0–0)
PHOSPHATE SERPL-MCNC: 3.2 MG/DL — SIGNIFICANT CHANGE UP (ref 2.5–4.5)
PLATELET # BLD AUTO: 321 K/UL — SIGNIFICANT CHANGE UP (ref 150–400)
POTASSIUM SERPL-MCNC: 4 MMOL/L — SIGNIFICANT CHANGE UP (ref 3.5–5.3)
POTASSIUM SERPL-SCNC: 4 MMOL/L — SIGNIFICANT CHANGE UP (ref 3.5–5.3)
PROT SERPL-MCNC: 8.3 G/DL — SIGNIFICANT CHANGE UP (ref 6–8.3)
RBC # BLD: 5.68 M/UL — SIGNIFICANT CHANGE UP (ref 4.2–5.8)
RBC # FLD: 12.1 % — SIGNIFICANT CHANGE UP (ref 10.3–14.5)
SODIUM SERPL-SCNC: 141 MMOL/L — SIGNIFICANT CHANGE UP (ref 135–145)
WBC # BLD: 7.75 K/UL — SIGNIFICANT CHANGE UP (ref 3.8–10.5)
WBC # FLD AUTO: 7.75 K/UL — SIGNIFICANT CHANGE UP (ref 3.8–10.5)

## 2024-11-02 PROCEDURE — 99222 1ST HOSP IP/OBS MODERATE 55: CPT

## 2024-11-02 PROCEDURE — 99285 EMERGENCY DEPT VISIT HI MDM: CPT

## 2024-11-02 RX ORDER — DIAZEPAM 10 MG/1
10 FILM BUCCAL ONCE
Refills: 0 | Status: DISCONTINUED | OUTPATIENT
Start: 2024-11-02 | End: 2024-11-02

## 2024-11-02 RX ADMIN — DIAZEPAM 10 MILLIGRAM(S): 10 FILM BUCCAL at 20:20

## 2024-11-02 RX ADMIN — Medication 2000 MILLILITER(S): at 20:20

## 2024-11-02 NOTE — ED PROVIDER NOTE - CARE PLAN
1 Principal Discharge DX:	Alcohol withdrawal  Secondary Diagnosis:	Combinations of opioid type drug with any other drug dependence

## 2024-11-02 NOTE — ED PROVIDER NOTE - CLINICAL SUMMARY MEDICAL DECISION MAKING FREE TEXT BOX
33-year-old male presenting with polypharmacy withdrawal, high concern for alcohol withdrawal, opioid withdrawal, will treat with bzd, if needed initiate high dose buprenorphine (32 mg) due to use of fentanyl. PMID: [id number]

## 2024-11-02 NOTE — ED ADULT TRIAGE NOTE - CHIEF COMPLAINT QUOTE
room air Pt reports that he has withdrawal symptoms from Cocaine ,Fentanyl alcohol  started yesterday

## 2024-11-02 NOTE — ED PROVIDER NOTE - PHYSICAL EXAMINATION
Physical Exam:  General: + mild agitation, tremors and tongue tremor  Eyes: EOMI, Conjunctiva and sclera clear  Neck: No JVD  Lungs: Clear to auscultation bilaterally, no wheeze, no rhonchi  Heart: Normal S1, S2, no murmurs  Abdomen: Soft, nontender, nondistended, no CVA tenderness  Extremities: 2+ peripheral pulses, no edema  Psych: AAO X3  Neurologic: Non-focal

## 2024-11-02 NOTE — ED ADULT NURSE NOTE - OBJECTIVE STATEMENT
Pt reports that he has withdrawal symptoms from Cocaine ,Fentanyl alcohol  started yesterday. Pt reported he now has bodyaches

## 2024-11-02 NOTE — ED PROVIDER NOTE - NS ED ROS FT
GENERAL: No fever or chills  HEENT: No trouble swallowing or speaking  CARDIAC: No chest pain  PULMONARY: No cough or SOB  GI: No abdominal pain, + nausea, + diarrhea  : No changes in urination  SKIN: No rashes  NEURO: No headache, no numbness  MSK: No joint pain  Otherwise as HPI or negative.

## 2024-11-02 NOTE — ED PROVIDER NOTE - OBJECTIVE STATEMENT
33-year-old male history of alcohol use disorder, drinking 10 drinks per day, for several months, daily fentanyl and cocaine use, presenting with polypharmacy withdrawal.  Initially tachycardic, with tremors, diarrhea, piloerection, combination of alcohol withdrawal and opioid withdrawal.  Patient denies any other recreational drug use at this time, states daily insufflation of fentanyl, inhalation of cocaine, prior use of Suboxone with interest in reinitiation.
[place]

## 2024-11-02 NOTE — ED PROVIDER NOTE - PROGRESS NOTE DETAILS
Josh Adams MD:  Initial CIWA score 8, improved status post diazepam resting comfortably at this time will admit for repeat benzodiazepines, consideration for Suboxone recommend initial therapy of 32 mg at once to avoid risk of worsening opioid withdrawal, if questions or concerns please consult toxicology to potentially recommend combination low-dose fentanyl and high-dose buprenorphine initiation.  Particularly in setting of daily fentanyl use high dose buprenorphine is standard of care. PMID: [id number]

## 2024-11-03 DIAGNOSIS — F11.93 OPIOID USE, UNSPECIFIED WITH WITHDRAWAL: ICD-10-CM

## 2024-11-03 DIAGNOSIS — Z29.9 ENCOUNTER FOR PROPHYLACTIC MEASURES, UNSPECIFIED: ICD-10-CM

## 2024-11-03 DIAGNOSIS — F19.90 OTHER PSYCHOACTIVE SUBSTANCE USE, UNSPECIFIED, UNCOMPLICATED: ICD-10-CM

## 2024-11-03 DIAGNOSIS — F10.939 ALCOHOL USE, UNSPECIFIED WITH WITHDRAWAL, UNSPECIFIED: ICD-10-CM

## 2024-11-03 LAB
ALBUMIN SERPL ELPH-MCNC: 3.7 G/DL — SIGNIFICANT CHANGE UP (ref 3.5–5)
ALP SERPL-CCNC: 73 U/L — SIGNIFICANT CHANGE UP (ref 40–120)
ALT FLD-CCNC: 26 U/L DA — SIGNIFICANT CHANGE UP (ref 10–60)
AMPHET UR-MCNC: NEGATIVE — SIGNIFICANT CHANGE UP
ANION GAP SERPL CALC-SCNC: 5 MMOL/L — SIGNIFICANT CHANGE UP (ref 5–17)
AST SERPL-CCNC: 15 U/L — SIGNIFICANT CHANGE UP (ref 10–40)
BARBITURATES UR SCN-MCNC: NEGATIVE — SIGNIFICANT CHANGE UP
BASOPHILS # BLD AUTO: 0.04 K/UL — SIGNIFICANT CHANGE UP (ref 0–0.2)
BASOPHILS NFR BLD AUTO: 0.5 % — SIGNIFICANT CHANGE UP (ref 0–2)
BENZODIAZ UR-MCNC: SIGNIFICANT CHANGE UP
BILIRUB SERPL-MCNC: 0.4 MG/DL — SIGNIFICANT CHANGE UP (ref 0.2–1.2)
BUN SERPL-MCNC: 14 MG/DL — SIGNIFICANT CHANGE UP (ref 7–18)
CALCIUM SERPL-MCNC: 9.2 MG/DL — SIGNIFICANT CHANGE UP (ref 8.4–10.5)
CHLORIDE SERPL-SCNC: 108 MMOL/L — SIGNIFICANT CHANGE UP (ref 96–108)
CO2 SERPL-SCNC: 26 MMOL/L — SIGNIFICANT CHANGE UP (ref 22–31)
COCAINE METAB.OTHER UR-MCNC: POSITIVE
CREAT SERPL-MCNC: 0.82 MG/DL — SIGNIFICANT CHANGE UP (ref 0.5–1.3)
EGFR: 118 ML/MIN/1.73M2 — SIGNIFICANT CHANGE UP
EOSINOPHIL # BLD AUTO: 0.23 K/UL — SIGNIFICANT CHANGE UP (ref 0–0.5)
EOSINOPHIL NFR BLD AUTO: 3.1 % — SIGNIFICANT CHANGE UP (ref 0–6)
GLUCOSE SERPL-MCNC: 127 MG/DL — HIGH (ref 70–99)
HCT VFR BLD CALC: 46.7 % — SIGNIFICANT CHANGE UP (ref 39–50)
HGB BLD-MCNC: 16.2 G/DL — SIGNIFICANT CHANGE UP (ref 13–17)
IMM GRANULOCYTES NFR BLD AUTO: 0.3 % — SIGNIFICANT CHANGE UP (ref 0–0.9)
LYMPHOCYTES # BLD AUTO: 2.14 K/UL — SIGNIFICANT CHANGE UP (ref 1–3.3)
LYMPHOCYTES # BLD AUTO: 28.4 % — SIGNIFICANT CHANGE UP (ref 13–44)
MAGNESIUM SERPL-MCNC: 2 MG/DL — SIGNIFICANT CHANGE UP (ref 1.6–2.6)
MCHC RBC-ENTMCNC: 30.5 PG — SIGNIFICANT CHANGE UP (ref 27–34)
MCHC RBC-ENTMCNC: 34.7 G/DL — SIGNIFICANT CHANGE UP (ref 32–36)
MCV RBC AUTO: 87.8 FL — SIGNIFICANT CHANGE UP (ref 80–100)
METHADONE UR-MCNC: NEGATIVE — SIGNIFICANT CHANGE UP
MONOCYTES # BLD AUTO: 0.47 K/UL — SIGNIFICANT CHANGE UP (ref 0–0.9)
MONOCYTES NFR BLD AUTO: 6.2 % — SIGNIFICANT CHANGE UP (ref 2–14)
NEUTROPHILS # BLD AUTO: 4.63 K/UL — SIGNIFICANT CHANGE UP (ref 1.8–7.4)
NEUTROPHILS NFR BLD AUTO: 61.5 % — SIGNIFICANT CHANGE UP (ref 43–77)
NRBC # BLD: 0 /100 WBCS — SIGNIFICANT CHANGE UP (ref 0–0)
OPIATES UR-MCNC: NEGATIVE — SIGNIFICANT CHANGE UP
PCP SPEC-MCNC: SIGNIFICANT CHANGE UP
PCP UR-MCNC: NEGATIVE — SIGNIFICANT CHANGE UP
PHOSPHATE SERPL-MCNC: 2.8 MG/DL — SIGNIFICANT CHANGE UP (ref 2.5–4.5)
PLATELET # BLD AUTO: 284 K/UL — SIGNIFICANT CHANGE UP (ref 150–400)
POTASSIUM SERPL-MCNC: 3.8 MMOL/L — SIGNIFICANT CHANGE UP (ref 3.5–5.3)
POTASSIUM SERPL-SCNC: 3.8 MMOL/L — SIGNIFICANT CHANGE UP (ref 3.5–5.3)
PROT SERPL-MCNC: 7.4 G/DL — SIGNIFICANT CHANGE UP (ref 6–8.3)
RBC # BLD: 5.32 M/UL — SIGNIFICANT CHANGE UP (ref 4.2–5.8)
RBC # FLD: 12 % — SIGNIFICANT CHANGE UP (ref 10.3–14.5)
SODIUM SERPL-SCNC: 139 MMOL/L — SIGNIFICANT CHANGE UP (ref 135–145)
THC UR QL: POSITIVE
WBC # BLD: 7.53 K/UL — SIGNIFICANT CHANGE UP (ref 3.8–10.5)
WBC # FLD AUTO: 7.53 K/UL — SIGNIFICANT CHANGE UP (ref 3.8–10.5)

## 2024-11-03 PROCEDURE — 99232 SBSQ HOSP IP/OBS MODERATE 35: CPT

## 2024-11-03 RX ORDER — NICOTINE POLACRILEX 4 MG/1
1 GUM, CHEWING ORAL DAILY
Refills: 0 | Status: DISCONTINUED | OUTPATIENT
Start: 2024-11-03 | End: 2024-11-04

## 2024-11-03 RX ORDER — LORAZEPAM 2 MG
2 TABLET ORAL
Refills: 0 | Status: DISCONTINUED | OUTPATIENT
Start: 2024-11-03 | End: 2024-11-06

## 2024-11-03 RX ORDER — B-COMPLEX WITH VITAMIN C
1 VIAL (ML) INJECTION DAILY
Refills: 0 | Status: DISCONTINUED | OUTPATIENT
Start: 2024-11-03 | End: 2024-11-06

## 2024-11-03 RX ORDER — ACETAMINOPHEN 500 MG
650 TABLET ORAL EVERY 6 HOURS
Refills: 0 | Status: DISCONTINUED | OUTPATIENT
Start: 2024-11-03 | End: 2024-11-04

## 2024-11-03 RX ORDER — LIDOCAINE HYDROCHLORIDE 40 MG/ML
1 SOLUTION TOPICAL DAILY
Refills: 0 | Status: DISCONTINUED | OUTPATIENT
Start: 2024-11-03 | End: 2024-11-04

## 2024-11-03 RX ORDER — FOLIC ACID 1 MG/1
1 TABLET ORAL DAILY
Refills: 0 | Status: DISCONTINUED | OUTPATIENT
Start: 2024-11-03 | End: 2024-11-06

## 2024-11-03 RX ORDER — TRAMADOL HYDROCHLORIDE 50 MG/1
25 TABLET, COATED ORAL EVERY 6 HOURS
Refills: 0 | Status: DISCONTINUED | OUTPATIENT
Start: 2024-11-03 | End: 2024-11-04

## 2024-11-03 RX ORDER — THIAMINE HCL 100 MG
100 TABLET ORAL ONCE
Refills: 0 | Status: DISCONTINUED | OUTPATIENT
Start: 2024-11-03 | End: 2024-11-03

## 2024-11-03 RX ORDER — THIAMINE HCL 100 MG
500 TABLET ORAL EVERY 8 HOURS
Refills: 0 | Status: COMPLETED | OUTPATIENT
Start: 2024-11-03 | End: 2024-11-03

## 2024-11-03 RX ADMIN — Medication 1 TABLET(S): at 11:38

## 2024-11-03 RX ADMIN — Medication 105 MILLIGRAM(S): at 22:11

## 2024-11-03 RX ADMIN — Medication 2 MILLIGRAM(S): at 11:38

## 2024-11-03 RX ADMIN — Medication 105 MILLIGRAM(S): at 14:57

## 2024-11-03 RX ADMIN — LIDOCAINE HYDROCHLORIDE 1 PATCH: 40 SOLUTION TOPICAL at 12:00

## 2024-11-03 RX ADMIN — Medication 105 MILLIGRAM(S): at 05:57

## 2024-11-03 RX ADMIN — TRAMADOL HYDROCHLORIDE 25 MILLIGRAM(S): 50 TABLET, COATED ORAL at 21:00

## 2024-11-03 RX ADMIN — Medication 650 MILLIGRAM(S): at 13:10

## 2024-11-03 RX ADMIN — Medication 650 MILLIGRAM(S): at 12:01

## 2024-11-03 RX ADMIN — Medication 650 MILLIGRAM(S): at 19:27

## 2024-11-03 RX ADMIN — TRAMADOL HYDROCHLORIDE 25 MILLIGRAM(S): 50 TABLET, COATED ORAL at 19:50

## 2024-11-03 RX ADMIN — NICOTINE POLACRILEX 1 PATCH: 4 GUM, CHEWING ORAL at 11:38

## 2024-11-03 RX ADMIN — FOLIC ACID 1 MILLIGRAM(S): 1 TABLET ORAL at 11:38

## 2024-11-03 RX ADMIN — Medication 650 MILLIGRAM(S): at 18:24

## 2024-11-03 RX ADMIN — NICOTINE POLACRILEX 1 PATCH: 4 GUM, CHEWING ORAL at 19:56

## 2024-11-03 NOTE — H&P ADULT - ATTENDING COMMENTS
Vital Signs Last 24 Hrs  T(C): 36.6 (03 Nov 2024 00:43), Max: 37.2 (02 Nov 2024 19:23)  T(F): 97.8 (03 Nov 2024 00:43), Max: 99 (02 Nov 2024 19:23)  HR: 81 (03 Nov 2024 00:43) (81 - 109)  BP: 149/85 (03 Nov 2024 00:43) (146/95 - 149/85)  RR: 18 (03 Nov 2024 00:43) (18 - 18)  SpO2: 98% (03 Nov 2024 00:43) (98% - 99%)  Parameters below as of 03 Nov 2024 00:43  Patient On (Oxygen Delivery Method): room air    Labs reviewed  hgb 17.3  HCT 50.1    otherwise unremarkable     Impression   35 year old man with medical hx of polysubstance abuse including alcohol / crack cocaine/ oxycodone / fentanyl/ marijuana presenting in the ED with generalized weakness and other symptoms concerning for opioid withdrawal.  Will admit to Medicine   [facility] protocol   Pain management for opioid abuse/withdrawal   Gentle IVF hydration   Monitor closely; no acute intervention for opoid symptoms for now as patient stable   borderline polycythemia might be from dehydration -> repeat after hydration

## 2024-11-03 NOTE — H&P ADULT - ASSESSMENT
35y/M, from home, h/o alcohol use disorder, polysubstance use disorder presents to ED with headache and generalised body ache which started this morning associated with increased sweating, chills and rigor. Admitted for polysubs withdrawal.

## 2024-11-03 NOTE — H&P ADULT - PROBLEM SELECTOR PLAN 2
last drink on Friday  BAL <3  CIWA 5 at the time of examination  -started symptom triggered ativan  -monitor CIWA  -started thiamine, folic acid and multivitamin

## 2024-11-03 NOTE — H&P ADULT - NSHPPHYSICALEXAM_GEN_ALL_CORE
T(C): 36.6 (11-03-24 @ 00:43), Max: 37.2 (11-02-24 @ 19:23)  HR: 73 (11-03-24 @ 00:43) (73 - 109)  BP: 116/70 (11-03-24 @ 00:43) (116/70 - 146/95)  RR: 18 (11-03-24 @ 00:43) (18 - 18)  SpO2: 95% (11-03-24 @ 00:43) (95% - 99%)    GENERAL: NAD, mildly anxious, yawning frequently; no signs of respiratory distress  HEAD:  Atraumatic, Normocephalic  EYES: EOMI, PERRLA, conjunctiva and sclera clear  NECK: Supple, No JVD  CHEST/LUNG: Clear to auscultation bilaterally; No wheeze; No crackles; No accessory muscles used  HEART: Regular rate and rhythm; No murmurs;   ABDOMEN: Soft, Nontender, Nondistended; Bowel sounds present; No guarding  EXTREMITIES: Mild tremors on extension of arms;  2+ Peripheral Pulses, No cyanosis or edema  PSYCH: AAOx3  NEUROLOGY: No motor or sensory deficit  SKIN: No rashes or lesions

## 2024-11-03 NOTE — H&P ADULT - HISTORY OF PRESENT ILLNESS
35y/M, from home, h/o alcohol use disorder, polysubstance use disorder. presents to ED with  35y/M, from home, h/o alcohol use disorder, polysubstance use disorder presents to ED with headache and generalised body ache which started this morning associated with increased sweating, chills and rigor, denies fever. Mentions runny nose, frequent yawning and multiple episodes of loose stool. Denies nausea and vomiting, abd pain, hallucination. Denies chest pain, palpitation, SOB, numbness and focal weakness. Patient admits to polysubstance use, drinks 10 drinks per day, smokes marijuana, smokes cocaine, sniffs fentanyl and takes oxycodone pills whenever he can get his hands on it. Last drink was on Friday, last cocaine, fentanyl and oxy use was also on Friday.   States he was on Suboxon for 6months and stopped on last week on August and went back to abusing.  s/p valium in ED

## 2024-11-03 NOTE — H&P ADULT - NSHPREVIEWOFSYSTEMS_GEN_ALL_CORE
- CONSTITUTIONAL: Denies fever and chills  - HEENT: Denies changes in vision and hearing.  - RESPIRATORY: Denies SOB and cough.  - CV: Denies chest pain and palpitations  - GI: Denies abdominal pain, nausea, vomiting   - : Denies dysuria and urinary frequency.  - SKIN: Denies rash and pruritus.  - NEUROLOGICAL: + headache   - PSYCHIATRIC: Denies recent changes in mood. Denies anxiety and depression.

## 2024-11-03 NOTE — H&P ADULT - PROBLEM SELECTOR PLAN 3
Patient admits to polysubstance use, drinks 10 drinks per day, smokes marijuana, smokes cocaine, sniffs fentanyl and takes oxycodone pills  -f/u Utox  -Pain management consulted  -SW consulted

## 2024-11-03 NOTE — PATIENT PROFILE ADULT - FALL HARM RISK - HARM RISK INTERVENTIONS
Assistance with ambulation/Assistance OOB with selected safe patient handling equipment/Communicate Risk of Fall with Harm to all staff/Discuss with provider need for PT consult/Monitor for mental status changes/Monitor gait and stability/Provide patient with walking aids - walker, cane, crutches/Reinforce activity limits and safety measures with patient and family/Tailored Fall Risk Interventions/Toileting schedule using arm’s reach rule for commode and bathroom/Use of alarms - bed, chair and/or voice tab/Visual Cue: Yellow wristband and red socks/Bed in lowest position, wheels locked, appropriate side rails in place/Call bell, personal items and telephone in reach/Instruct patient to call for assistance before getting out of bed or chair/Non-slip footwear when patient is out of bed/[place] to call system/Physically safe environment - no spills, clutter or unnecessary equipment/Purposeful Proactive Rounding/Room/bathroom lighting operational, light cord in reach

## 2024-11-03 NOTE — H&P ADULT - PROBLEM SELECTOR PLAN 1
sniffs fentanyl and uses oxycodone pill  s/p Valium in ED  pt sleeping comfortably at the moment  -may start Suboxon vs methadone in am  -cautiously start opioid as pt on symp triggered BZD for alcohol withdrawal  -pain management consulted

## 2024-11-03 NOTE — CHART NOTE - NSCHARTNOTEFT_GEN_A_CORE
35M from home with PMH polysubstance abuse (alcohol, crack cocaine, oxycodone, fentanyl, marijuana), who presented to the ED with generalized weakness and symptoms concerning for opioid withdrawal such as loose stools and yawning. Admitted for further management.     Patient seen and examined at bedside. No acute events overnight. This AM, reports feeling ok, does have chronic back pain. States he is on Suboxone but does not know where his card is.     Exam includes NAD, RRR, CTAB, abd soft and NT, no LE swelling, AOx3    #Polysubstance abuse   #Opioid withdrawal   #EtOH withdrawal   -urine drug screen positive benzodiazepines and cocaine  -pain management consult in AM  -monitor on CIWA  -folic acid, thiamine, MVI  -nicotine patch   -tylenol prn, can trial toradol for severe pain  -SW consult

## 2024-11-04 DIAGNOSIS — F17.200 NICOTINE DEPENDENCE, UNSPECIFIED, UNCOMPLICATED: ICD-10-CM

## 2024-11-04 LAB
ALBUMIN SERPL ELPH-MCNC: 3.9 G/DL — SIGNIFICANT CHANGE UP (ref 3.5–5)
ALP SERPL-CCNC: 72 U/L — SIGNIFICANT CHANGE UP (ref 40–120)
ALT FLD-CCNC: 24 U/L DA — SIGNIFICANT CHANGE UP (ref 10–60)
ANION GAP SERPL CALC-SCNC: 5 MMOL/L — SIGNIFICANT CHANGE UP (ref 5–17)
AST SERPL-CCNC: 14 U/L — SIGNIFICANT CHANGE UP (ref 10–40)
BASOPHILS # BLD AUTO: 0.04 K/UL — SIGNIFICANT CHANGE UP (ref 0–0.2)
BASOPHILS NFR BLD AUTO: 0.4 % — SIGNIFICANT CHANGE UP (ref 0–2)
BILIRUB SERPL-MCNC: 0.4 MG/DL — SIGNIFICANT CHANGE UP (ref 0.2–1.2)
BUN SERPL-MCNC: 17 MG/DL — SIGNIFICANT CHANGE UP (ref 7–18)
CALCIUM SERPL-MCNC: 9.5 MG/DL — SIGNIFICANT CHANGE UP (ref 8.4–10.5)
CHLORIDE SERPL-SCNC: 110 MMOL/L — HIGH (ref 96–108)
CO2 SERPL-SCNC: 26 MMOL/L — SIGNIFICANT CHANGE UP (ref 22–31)
CREAT SERPL-MCNC: 0.98 MG/DL — SIGNIFICANT CHANGE UP (ref 0.5–1.3)
EGFR: 104 ML/MIN/1.73M2 — SIGNIFICANT CHANGE UP
EOSINOPHIL # BLD AUTO: 0.22 K/UL — SIGNIFICANT CHANGE UP (ref 0–0.5)
EOSINOPHIL NFR BLD AUTO: 2.4 % — SIGNIFICANT CHANGE UP (ref 0–6)
GLUCOSE SERPL-MCNC: 115 MG/DL — HIGH (ref 70–99)
HCT VFR BLD CALC: 47.2 % — SIGNIFICANT CHANGE UP (ref 39–50)
HGB BLD-MCNC: 16.5 G/DL — SIGNIFICANT CHANGE UP (ref 13–17)
IMM GRANULOCYTES NFR BLD AUTO: 0.2 % — SIGNIFICANT CHANGE UP (ref 0–0.9)
LYMPHOCYTES # BLD AUTO: 2.89 K/UL — SIGNIFICANT CHANGE UP (ref 1–3.3)
LYMPHOCYTES # BLD AUTO: 32.1 % — SIGNIFICANT CHANGE UP (ref 13–44)
MAGNESIUM SERPL-MCNC: 2 MG/DL — SIGNIFICANT CHANGE UP (ref 1.6–2.6)
MCHC RBC-ENTMCNC: 30.7 PG — SIGNIFICANT CHANGE UP (ref 27–34)
MCHC RBC-ENTMCNC: 35 G/DL — SIGNIFICANT CHANGE UP (ref 32–36)
MCV RBC AUTO: 87.9 FL — SIGNIFICANT CHANGE UP (ref 80–100)
MONOCYTES # BLD AUTO: 0.58 K/UL — SIGNIFICANT CHANGE UP (ref 0–0.9)
MONOCYTES NFR BLD AUTO: 6.4 % — SIGNIFICANT CHANGE UP (ref 2–14)
MRSA PCR RESULT.: SIGNIFICANT CHANGE UP
NEUTROPHILS # BLD AUTO: 5.25 K/UL — SIGNIFICANT CHANGE UP (ref 1.8–7.4)
NEUTROPHILS NFR BLD AUTO: 58.5 % — SIGNIFICANT CHANGE UP (ref 43–77)
NRBC # BLD: 0 /100 WBCS — SIGNIFICANT CHANGE UP (ref 0–0)
PHOSPHATE SERPL-MCNC: 4 MG/DL — SIGNIFICANT CHANGE UP (ref 2.5–4.5)
PLATELET # BLD AUTO: 285 K/UL — SIGNIFICANT CHANGE UP (ref 150–400)
POTASSIUM SERPL-MCNC: 4.1 MMOL/L — SIGNIFICANT CHANGE UP (ref 3.5–5.3)
POTASSIUM SERPL-SCNC: 4.1 MMOL/L — SIGNIFICANT CHANGE UP (ref 3.5–5.3)
PROT SERPL-MCNC: 7.6 G/DL — SIGNIFICANT CHANGE UP (ref 6–8.3)
RBC # BLD: 5.37 M/UL — SIGNIFICANT CHANGE UP (ref 4.2–5.8)
RBC # FLD: 12 % — SIGNIFICANT CHANGE UP (ref 10.3–14.5)
S AUREUS DNA NOSE QL NAA+PROBE: SIGNIFICANT CHANGE UP
SODIUM SERPL-SCNC: 141 MMOL/L — SIGNIFICANT CHANGE UP (ref 135–145)
WBC # BLD: 9 K/UL — SIGNIFICANT CHANGE UP (ref 3.8–10.5)
WBC # FLD AUTO: 9 K/UL — SIGNIFICANT CHANGE UP (ref 3.8–10.5)

## 2024-11-04 PROCEDURE — 99232 SBSQ HOSP IP/OBS MODERATE 35: CPT

## 2024-11-04 PROCEDURE — 99222 1ST HOSP IP/OBS MODERATE 55: CPT

## 2024-11-04 PROCEDURE — 93010 ELECTROCARDIOGRAM REPORT: CPT

## 2024-11-04 RX ORDER — NICOTINE POLACRILEX 4 MG/1
1 GUM, CHEWING ORAL DAILY
Refills: 0 | Status: DISCONTINUED | OUTPATIENT
Start: 2024-11-04 | End: 2024-11-06

## 2024-11-04 RX ORDER — NICOTINE POLACRILEX 4 MG/1
2 GUM, CHEWING ORAL EVERY 4 HOURS
Refills: 0 | Status: DISCONTINUED | OUTPATIENT
Start: 2024-11-04 | End: 2024-11-04

## 2024-11-04 RX ORDER — LIDOCAINE HYDROCHLORIDE 40 MG/ML
1 SOLUTION TOPICAL DAILY
Refills: 0 | Status: DISCONTINUED | OUTPATIENT
Start: 2024-11-04 | End: 2024-11-06

## 2024-11-04 RX ORDER — METHADONE HYDROCHLORIDE 10 MG/1
20 TABLET ORAL
Refills: 0 | Status: DISCONTINUED | OUTPATIENT
Start: 2024-11-04 | End: 2024-11-06

## 2024-11-04 RX ORDER — ACETAMINOPHEN 500 MG
1000 TABLET ORAL EVERY 6 HOURS
Refills: 0 | Status: DISCONTINUED | OUTPATIENT
Start: 2024-11-04 | End: 2024-11-06

## 2024-11-04 RX ORDER — NICOTINE POLACRILEX 4 MG/1
2 GUM, CHEWING ORAL EVERY 4 HOURS
Refills: 0 | Status: DISCONTINUED | OUTPATIENT
Start: 2024-11-04 | End: 2024-11-06

## 2024-11-04 RX ADMIN — Medication 1 TABLET(S): at 11:44

## 2024-11-04 RX ADMIN — NICOTINE POLACRILEX 2 MILLIGRAM(S): 4 GUM, CHEWING ORAL at 13:51

## 2024-11-04 RX ADMIN — METHADONE HYDROCHLORIDE 20 MILLIGRAM(S): 10 TABLET ORAL at 11:44

## 2024-11-04 RX ADMIN — LIDOCAINE HYDROCHLORIDE 1 PATCH: 40 SOLUTION TOPICAL at 00:00

## 2024-11-04 RX ADMIN — LIDOCAINE HYDROCHLORIDE 1 PATCH: 40 SOLUTION TOPICAL at 11:45

## 2024-11-04 RX ADMIN — NICOTINE POLACRILEX 1 PATCH: 4 GUM, CHEWING ORAL at 07:28

## 2024-11-04 RX ADMIN — FOLIC ACID 1 MILLIGRAM(S): 1 TABLET ORAL at 11:44

## 2024-11-04 RX ADMIN — NICOTINE POLACRILEX 1 PATCH: 4 GUM, CHEWING ORAL at 11:48

## 2024-11-04 RX ADMIN — LIDOCAINE HYDROCHLORIDE 1 PATCH: 40 SOLUTION TOPICAL at 19:50

## 2024-11-04 RX ADMIN — NICOTINE POLACRILEX 1 PATCH: 4 GUM, CHEWING ORAL at 19:51

## 2024-11-04 RX ADMIN — LIDOCAINE HYDROCHLORIDE 1 PATCH: 40 SOLUTION TOPICAL at 23:25

## 2024-11-04 RX ADMIN — NICOTINE POLACRILEX 1 PATCH: 4 GUM, CHEWING ORAL at 11:45

## 2024-11-04 NOTE — CONSULT NOTE ADULT - ASSESSMENT
Search Terms: Mariano Mark, 1990Search Date: 11/04/2024 09:10:46 AM  The Drug Utilization Report below displays all of the controlled substance prescriptions, if any, that your patient has filled in the last twelve months. The information displayed on this report is compiled from pharmacy submissions to the Department, and accurately reflects the information as submitted by the pharmacies.    This report was requested by: Dulce Maria Montez | Reference #: [id number]    Practitioner Count: 0  Pharmacy Count: 0  Current Opioid Prescriptions: 0  Current Benzodiazepine Prescriptions: 0  Current Stimulant Prescriptions: 0      Patient Demographic Information (PDI)       PDI	First Name	Last Name	Birth Date	Gender	Street Address	[facility]	Zip Code  A	Mariano	Devivo	1990	Male	[address]	[address]  B	Mariano	Devivo	1990	Male	[address]	[facility]	[address]  C	Mariano	Devivo	1990	Male	[address] [facility]	[address]  D	Mariano	Devivo	1990	Male	[address]	[facility]	[address]  E	Mariano	Devivo	1990	Male	[address] [facility]	[address]    Prescription Information      PDI Filter:    PDI	My Rx	Current Rx	Drug Type	Rx Written	Rx Dispensed	Drug	Quantity	Days Supply	Prescriber Name	Prescriber KEYANA #	Payment Method	Dispenser  A	N	N	O	06/22/2024	06/22/2024	buprenorphine-naloxone 8-2 mg sl film	60	30	Maicol Lanier	[id number]	Medicaid	[facility]  A	N	N	O	06/07/2024	06/08/2024	buprenorphine-naloxone 8-2 mg sl film	30	15	Yolande Maicol	[id number]	Medicaid	[facility]  B	N	N	O	03/25/2024	03/25/2024	buprenorphine-naloxone 8-2 mg sl film	28	14	Issa Robles MD	[id number]	Medicaid	[place]  [place]  C	N	N	O	05/14/2024	05/15/2024	buprenorphine-naloxone 8-2 mg sl film	14	7	Gonzalo Lugo	[id number]	Medicaid	[place]   C	N	N	O	05/07/2024	05/08/2024	buprenorphine-naloxone 8-2 mg sl film	14	7	Ramón Yeager	[id number]	Medicaid	[place]   D	N	N	O	07/31/2024	07/31/2024	buprenorphine-naloxone 8-2 mg sl film	42	21	Deshaun Ramos	[id number]	Medicaid	[id number]  D	N	N	O	07/22/2024	07/22/2024	buprenorphine-naloxone 8-2 mg sl film	14	7	Clinton Durbin (MSN)	[id number]	Medicaid	[id number]  E	N	N	O	04/05/2024	04/05/2024	buprenorphine-naloxone 8-2 mg sl film	60	30	Jason Mariee	[id number]	Medicaid	[id number]    * - Details of Drug Type : O = Opioid, B = Benzodiazepine, S = Stimulant

## 2024-11-04 NOTE — CONSULT NOTE ADULT - PROBLEM SELECTOR RECOMMENDATION 9
Pt with history of polysubstance use disorder currently on CIWA protocol. Patient reports using Fentanyl, crack cocaine, and "2-3 tall boy beers daily." Utox positive for cocaine and THC. Pt. reports being on Suboxone in the past and last taking it in August of this year when he had a relapse and starting using drugs again, verified on ISTOP. Patient expressed desire to try Methadone in order to get "clean." COWS Score 6 patient exhibiting mild signs/symptoms of opioid withdrawal. ORT 8 indicating high rate for opioid abuse.   Opioid pain recommendations   - Start Methadone 20mg PO daily. Monitor for respiratory depression/sedation. QTc 460  - Patient will need SW consult for enrollment in a Methadone clinic  Non-opioid pain recommendations   - No NSAIDs due to allergy  - Start Acetaminophen 1 gram PO q 6 hours PRN for moderate pain.  - Continue Lidoderm 4% patch daily for back pain. (12 hrs on/12 hrs off)  Bowel Regimen  - Continue Miralax 17G PO daily  - Continue Senna 2 tablets at bedtime for constipation  Mild pain (score 1-3)  - Non-pharmacological pain treatment recommendations  - Warm/ Cool packs PRN   - Repositioning extremity, elevation, imagery, relaxation, distraction.  - Physical therapy OOB if no contraindications   Recommendations discussed with primary team and RN

## 2024-11-04 NOTE — CONSULT NOTE ADULT - SUBJECTIVE AND OBJECTIVE BOX
Source of information: ROSALVA HERNANDEZ, Chart review  Patient language: English  : n/a    HPI:  35y/M, from home, h/o alcohol use disorder, polysubstance use disorder presents to ED with headache and generalised body ache which started this morning associated with increased sweating, chills and rigor, denies fever. Mentions runny nose, frequent yawning and multiple episodes of loose stool. Denies nausea and vomiting, abd pain, hallucination. Denies chest pain, palpitation, SOB, numbness and focal weakness. Patient admits to polysubstance use, drinks 10 drinks per day, smokes marijuana, smokes cocaine, sniffs fentanyl and takes oxycodone pills whenever he can get his hands on it. Last drink was on Friday, last cocaine, fentanyl and oxy use was also on Friday.   States he was on Suboxon for 6months and stopped on last week on August and went back to abusing.  s/p valium in ED (03 Nov 2024 00:27)    Pt is admitted for ETOH and opioid withdrawal, currently on CIWA protocol. Pain service consulted for management of polysubstance use disorder. Pt seen and examined at bedside, this morning. At time of assessment, patient resting in bed in NAD. COWS Score 6, exhibiting mild symptoms of opioid withdrawal. Patient reports using Fentanyl, crack cocaine, and "2-3 tall boy beers daily." Patient endorses history of left foot pain due to bone spurs and chronic back pain. Utox positive for cocaine and THC, reports pain score 5/10  SCALE USED: (1-10 VNRS). Pt. reports being on Suboxone in the past and last taking it in August of this year when he had a relapse and starting using drugs again, verified on ISTOP. Patient expressed desire to try Methadone in order to get "clean," education of medications for opioid use disorder provided and patient verbalized understanding. Patient also informed that he would also need to be enrolled in an outpatient Methadone program in order to continue treatment once discharged from the hospital, he verbalized understanding and is in agreement with plan of care. Pt tolerating PO diet. Denies lethargy, chest pain, SOB, nausea, vomiting, constipation. Reports last BM ***. Patient stated goal for pain control: to be able to take deep breaths, get out of bed to chair and ambulate with tolerable pain control. Pt denies taking medications for pain at home.     PAST MEDICAL & SURGICAL HISTORY:  Polysubstance abuse    No Past Surgical History    FAMILY HISTORY:    Social History:  Lives alone, currently unemployed (03 Nov 2024 00:27)  [x] Reports current cigarette smoking, marijuana, ETOH and illicit drug use disorder.    Allergies    ibuprofen (Hives)    Intolerances    MEDICATIONS  (STANDING):  folic acid 1 milliGRAM(s) Oral daily  lidocaine   4% Patch 1 Patch Transdermal daily  methadone    Tablet 20 milliGRAM(s) Oral <User Schedule>  multivitamin 1 Tablet(s) Oral daily  nicotine - 21 mG/24Hr(s) Patch 1 Patch Transdermal daily    MEDICATIONS  (PRN):  acetaminophen     Tablet .. 1000 milliGRAM(s) Oral every 6 hours PRN Moderate Pain (4 - 6)  LORazepam     Tablet 2 milliGRAM(s) Oral every 2 hours PRN Symptom-triggered 2 point increase in CIWA-Ar  LORazepam   Injectable 2 milliGRAM(s) IV Push every 1 hour PRN Symptom-triggered: each CIWA -Ar score 8 or GREATER  nicotine  Polacrilex Lozenge 2 milliGRAM(s) Oral every 4 hours PRN anxiety    Vital Signs Last 24 Hrs  T(C): 36.5 (04 Nov 2024 04:47), Max: 36.8 (03 Nov 2024 12:24)  T(F): 97.7 (04 Nov 2024 04:47), Max: 98.3 (03 Nov 2024 12:24)  HR: 68 (04 Nov 2024 04:47) (68 - 85)  BP: 144/96 (04 Nov 2024 04:47) (135/86 - 144/96)  BP(mean): 108 (04 Nov 2024 04:47) (99 - 108)  RR: 17 (04 Nov 2024 04:47) (17 - 18)  SpO2: 98% (04 Nov 2024 04:47) (97% - 98%)    Parameters below as of 04 Nov 2024 04:47  Patient On (Oxygen Delivery Method): room air    LABS: Reviewed.                          16.5   9.00  )-----------( 285      ( 04 Nov 2024 05:52 )             47.2     11-04    141  |  110[H]  |  17  ----------------------------<  115[H]  4.1   |  26  |  0.98    Ca    9.5      04 Nov 2024 05:52  Phos  4.0     11-04  Mg     2.0     11-04    TPro  7.6  /  Alb  3.9  /  TBili  0.4  /  DBili  x   /  AST  14  /  ALT  24  /  AlkPhos  72  11-04    LIVER FUNCTIONS - ( 04 Nov 2024 05:52 )  Alb: 3.9 g/dL / Pro: 7.6 g/dL / ALK PHOS: 72 U/L / ALT: 24 U/L DA / AST: 14 U/L / GGT: x           Urinalysis Basic - ( 04 Nov 2024 05:52 )    Color: x / Appearance: x / SG: x / pH: x  Gluc: 115 mg/dL / Ketone: x  / Bili: x / Urobili: x   Blood: x / Protein: x / Nitrite: x   Leuk Esterase: x / RBC: x / WBC x   Sq Epi: x / Non Sq Epi: x / Bacteria: x    CAPILLARY BLOOD GLUCOSE    Radiology: None to be Reviewed.     ORT Score -   Family Hx of substance abuse	Female	      Male  Alcohol 	                                           1                     3  Illegal drugs	                                   2                     3  Rx drugs                                           4 	                  4  Personal Hx of substance abuse		  Alcohol 	                                          3	                  3  Illegal drugs                                     4	                  4  Rx drugs                                            5 	                  5  Age between 16- 45 years	           1                     1  hx preadolescent sexual abuse	   3 	                  0  Psychological disease		  ADD, OCD, bipolar, schizophrenia   2	          2  Depression                                           1 	          1  Total: 8    a score of 3 or lower indicates low risk for opioid abuse		  a score of 4-7 indicates moderate risk for opioid abuse		  a score of 8 or higher indicates high risk for opioid abuse    COWS   Resting Pulse Rate: beats/minute  Measured after patient is sitting or lying for one minute  0 pulse rate 80 or below  1 pulse rate 81-1 00  2 pulse rate 101-120  4 pulse rate greater than 1 20  GI Upset: over last 1/2 hour  0 no GI symptoms  1 stomach cramps  2 nausea or loose stool  3 vomiting or diarrhea  5 multiple episodes of diarrhea or vomiting  Sweating: over past 1/2 hour not accounted for by  room temperature or patient activity.  0 no report of chills or flushing  1 subjective report of chills or flushing  2 flushed or observable moistness on face  3 beads of sweat on brow or face  4 sweat streaming off face  Tremor observation of outstretched hands  0 no tremor  1 tremor can be felt, but not observed  2 slight tremor observable  4 gross tremor or muscle twitching  Restlessness Observation during assessment  0 able to sit still  1 reports difficulty sitting still, but is able to do so  3 frequent shifting or extraneous movements of legs/arms  5 unable to sit still for more than a few seconds  Yawning Observation during assessment  0 no yawning  1 yawning once or twice during assessment  2 yawning three or more times during assessment  4 yawning several times/minute  Pupil size  0 pupils pinned or normal size for room light  1 pupils possibly larger than normal for room light  2 pupils moderately dilated  5 pupils so dilated that only the rim of the iris is visible  Anxiety or Irritability  0 none  1 patient reports increasing irritability or anxiousness  2 patient obviously irritable or anxious  4 patient so irritable or anxious that participation in  the assessment is difficult  Bone or Joint aches if patient was having pain  previously, only the additional component attributed  to opiates withdrawal is scored  0 not present  1 mild diffuse discomfort  2 patient reports severe diffuse aching of joints/muscles  4 patient is rubbing joints or muscles and is unable to sit  still because of discomfort  Gooseflesh skin  0 skin is smooth  3 piloerrection of skin can be felt or hairs standing up  on arms  5 prominent piloerrection  Runny nose or tearing Not accounted for by cold  -symptoms or allergies  0 not present  1 nasal stuffiness or unusually moist eyes  2 nose running or tearing  4 nose constantly running or tears streaming down cheeks  Total Score 6  The total score is the sum of all 11 items  21  Initials of person  completing assessment: SL  Score: 5- 12 = mild; 1 3-24 = moderate; 25-36 = moderately severe; more than 36 = severe withdrawal   	  REVIEW OF SYSTEMS:  CONSTITUTIONAL: No fever or fatigue  HEENT:  No difficulty hearing, no change in vision  NECK: No pain or stiffness  RESPIRATORY: No cough, wheezing, chills or hemoptysis; No shortness of breath  CARDIOVASCULAR: No chest pain, palpitations, dizziness, or leg swelling  GASTROINTESTINAL: No loss of appetite, decreased PO intake. No abdominal or epigastric pain. No nausea, vomiting; No diarrhea or constipation.   GENITOURINARY: No dysuria, frequency, hematuria, retention or incontinence  MUSCULOSKELETAL: + chronic left foot pain; + chronic low back pain, no upper or lower motor strength weakness, no saddle anesthesia, bowel/bladder incontinence, no falls   NEURO: No headaches, No numbness/tingling b/l LE, No weakness  ENDOCRINE: No polyuria, polydipsia, heat or cold intolerance; No hair loss  PSYCHIATRIC: No depression, anxiety or difficulty sleeping    PHYSICAL EXAM:  GENERAL:  Alert & Oriented X4,  NAD, Speech is clear.   RESPIRATORY: Respirations even and unlabored. Clear to auscultation bilaterally  CARDIOVASCULAR: Normal S1/S2, regular rate and rhythm  GASTROINTESTINAL:  Soft, Nontender, Nondistended; Bowel sounds present  PERIPHERAL VASCULAR:  Extremities warm without edema. 2+ Peripheral Pulses, No cyanosis, No calf tenderness  MUSCULOSKELETAL: Motor Strength 5/5 B/L upper and lower extremities; moves all extremities equally against gravity; ROM intact; negative SLR; No tenderness on palpation of all joints.   SKIN: Warm, dry, intact.    Risk factors associated with adverse outcomes related to opioid treatment  [ ] Concurrent benzodiazepine use  [x] History/ Active substance use or alcohol use disorder  [ ] Psychiatric co-morbidity  [ ] Sleep apnea  [ ] COPD  [ ] BMI> 35  [ ] Liver dysfunction  [ ] Renal dysfunction  [ ] CHF  [ ] Smoker  [ ] Age > 60 years    [x]  [place]  Reviewed and Copied to Chart. See below.    Plan of care and goal oriented pain management treatment options were discussed with patient and /or primary care giver; all questions and concerns were addressed and care was aligned with patient's wishes.    Educated patient on goal oriented pain management treatment options

## 2024-11-04 NOTE — PROGRESS NOTE ADULT - SUBJECTIVE AND OBJECTIVE BOX
Patient is a 33y old  Male who presents with a chief complaint of Opioid withdrawal (04 Nov 2024 11:30)    OVERNIGHT EVENTS: no acute changes.     Pt is aox3, anxious appearing, tolerating PO, ambulatory.     REVIEW OF SYSTEMS:  CONSTITUTIONAL: No fever, chills  ENMT:  No difficulty hearing, no change in vision  NECK: No pain or stiffness  RESPIRATORY: No cough, SOB  CARDIOVASCULAR: No chest pain, palpitations  GASTROINTESTINAL: No abdominal pain. No nausea, vomiting, or diarrhea  GENITOURINARY: No dysuria  NEUROLOGICAL: No HA  SKIN: No itching, burning, rashes, or lesions   LYMPH NODES: No enlarged glands  ENDOCRINE: No heat or cold intolerance; No hair loss  MUSCULOSKELETAL: +chronic lower back pain  PSYCHIATRIC: No depression, anxiety  HEME/LYMPH: No easy bruising, or bleeding gums    T(C): 36.7 (11-04-24 @ 11:55), Max: 36.7 (11-03-24 @ 21:02)  HR: 79 (11-04-24 @ 11:55) (68 - 85)  BP: 142/91 (11-04-24 @ 11:55) (142/91 - 144/96)  RR: 15 (11-04-24 @ 11:55) (15 - 17)  SpO2: 99% (11-04-24 @ 11:55) (97% - 99%)  Wt(kg): --Vital Signs Last 24 Hrs  T(C): 36.7 (04 Nov 2024 11:55), Max: 36.7 (03 Nov 2024 21:02)  T(F): 98 (04 Nov 2024 11:55), Max: 98.1 (03 Nov 2024 21:02)  HR: 79 (04 Nov 2024 11:55) (68 - 85)  BP: 142/91 (04 Nov 2024 11:55) (142/91 - 144/96)  BP(mean): 108 (04 Nov 2024 04:47) (99 - 108)  RR: 15 (04 Nov 2024 11:55) (15 - 17)  SpO2: 99% (04 Nov 2024 11:55) (97% - 99%)    Parameters below as of 04 Nov 2024 11:55  Patient On (Oxygen Delivery Method): room air        MEDICATIONS  (STANDING):  folic acid 1 milliGRAM(s) Oral daily  lidocaine   4% Patch 1 Patch Transdermal daily  methadone    Tablet 20 milliGRAM(s) Oral <User Schedule>  multivitamin 1 Tablet(s) Oral daily  nicotine - 21 mG/24Hr(s) Patch 1 Patch Transdermal daily    MEDICATIONS  (PRN):  acetaminophen     Tablet .. 1000 milliGRAM(s) Oral every 6 hours PRN Moderate Pain (4 - 6)  LORazepam     Tablet 2 milliGRAM(s) Oral every 2 hours PRN Symptom-triggered 2 point increase in CIWA-Ar  LORazepam   Injectable 2 milliGRAM(s) IV Push every 1 hour PRN Symptom-triggered: each CIWA -Ar score 8 or GREATER  nicotine  Polacrilex Gum 2 milliGRAM(s) Oral every 4 hours PRN anxiety/smoking cessation      PHYSICAL EXAM:  GENERAL: NAD  EYES: clear conjunctiva  ENMT: Moist mucous membranes  NECK: Supple, No JVD, Normal thyroid  CHEST/LUNG: Clear to auscultation bilaterally; No rales, rhonchi, wheezing, or rubs  HEART: S1, S2, Regular rate and rhythm  ABDOMEN: Soft, Nontender, Nondistended; Bowel sounds present  NEURO: Alert & Oriented X3  EXTREMITIES: No LE edema, no calf tenderness  LYMPH: No lymphadenopathy noted  SKIN: No rashes or lesions    Consultant(s) Notes Reviewed:  [x ] YES  [ ] NO  Care Discussed with Consultants/Other Providers [ x] YES  [ ] NO    LABS:                        16.5   9.00  )-----------( 285      ( 04 Nov 2024 05:52 )             47.2     11-04    141  |  110[H]  |  17  ----------------------------<  115[H]  4.1   |  26  |  0.98    Ca    9.5      04 Nov 2024 05:52  Phos  4.0     11-04  Mg     2.0     11-04    TPro  7.6  /  Alb  3.9  /  TBili  0.4  /  DBili  x   /  AST  14  /  ALT  24  /  AlkPhos  72  11-04      CAPILLARY BLOOD GLUCOSE            Urinalysis Basic - ( 04 Nov 2024 05:52 )    Color: x / Appearance: x / SG: x / pH: x  Gluc: 115 mg/dL / Ketone: x  / Bili: x / Urobili: x   Blood: x / Protein: x / Nitrite: x   Leuk Esterase: x / RBC: x / WBC x   Sq Epi: x / Non Sq Epi: x / Bacteria: x        RADIOLOGY & ADDITIONAL TESTS:  no imaging    Imaging Personally Reviewed:  [ ] YES  [ ] NO

## 2024-11-05 ENCOUNTER — TRANSCRIPTION ENCOUNTER (OUTPATIENT)
Age: 34
End: 2024-11-05

## 2024-11-05 LAB
ANION GAP SERPL CALC-SCNC: 7 MMOL/L — SIGNIFICANT CHANGE UP (ref 5–17)
BUN SERPL-MCNC: 21 MG/DL — HIGH (ref 7–18)
CALCIUM SERPL-MCNC: 9.4 MG/DL — SIGNIFICANT CHANGE UP (ref 8.4–10.5)
CHLORIDE SERPL-SCNC: 108 MMOL/L — SIGNIFICANT CHANGE UP (ref 96–108)
CO2 SERPL-SCNC: 25 MMOL/L — SIGNIFICANT CHANGE UP (ref 22–31)
CREAT SERPL-MCNC: 0.96 MG/DL — SIGNIFICANT CHANGE UP (ref 0.5–1.3)
EGFR: 106 ML/MIN/1.73M2 — SIGNIFICANT CHANGE UP
GLUCOSE SERPL-MCNC: 98 MG/DL — SIGNIFICANT CHANGE UP (ref 70–99)
HCT VFR BLD CALC: 45.9 % — SIGNIFICANT CHANGE UP (ref 39–50)
HGB BLD-MCNC: 15.9 G/DL — SIGNIFICANT CHANGE UP (ref 13–17)
MAGNESIUM SERPL-MCNC: 2 MG/DL — SIGNIFICANT CHANGE UP (ref 1.6–2.6)
MCHC RBC-ENTMCNC: 30.1 PG — SIGNIFICANT CHANGE UP (ref 27–34)
MCHC RBC-ENTMCNC: 34.6 G/DL — SIGNIFICANT CHANGE UP (ref 32–36)
MCV RBC AUTO: 86.9 FL — SIGNIFICANT CHANGE UP (ref 80–100)
NRBC # BLD: 0 /100 WBCS — SIGNIFICANT CHANGE UP (ref 0–0)
PLATELET # BLD AUTO: 281 K/UL — SIGNIFICANT CHANGE UP (ref 150–400)
POTASSIUM SERPL-MCNC: 3.8 MMOL/L — SIGNIFICANT CHANGE UP (ref 3.5–5.3)
POTASSIUM SERPL-SCNC: 3.8 MMOL/L — SIGNIFICANT CHANGE UP (ref 3.5–5.3)
RBC # BLD: 5.28 M/UL — SIGNIFICANT CHANGE UP (ref 4.2–5.8)
RBC # FLD: 12.4 % — SIGNIFICANT CHANGE UP (ref 10.3–14.5)
SODIUM SERPL-SCNC: 140 MMOL/L — SIGNIFICANT CHANGE UP (ref 135–145)
WBC # BLD: 8.9 K/UL — SIGNIFICANT CHANGE UP (ref 3.8–10.5)
WBC # FLD AUTO: 8.9 K/UL — SIGNIFICANT CHANGE UP (ref 3.8–10.5)

## 2024-11-05 PROCEDURE — 99233 SBSQ HOSP IP/OBS HIGH 50: CPT

## 2024-11-05 PROCEDURE — 99232 SBSQ HOSP IP/OBS MODERATE 35: CPT

## 2024-11-05 RX ADMIN — LIDOCAINE HYDROCHLORIDE 1 PATCH: 40 SOLUTION TOPICAL at 12:00

## 2024-11-05 RX ADMIN — FOLIC ACID 1 MILLIGRAM(S): 1 TABLET ORAL at 11:59

## 2024-11-05 RX ADMIN — LIDOCAINE HYDROCHLORIDE 1 PATCH: 40 SOLUTION TOPICAL at 19:18

## 2024-11-05 RX ADMIN — Medication 1000 MILLIGRAM(S): at 13:10

## 2024-11-05 RX ADMIN — Medication 1 TABLET(S): at 11:59

## 2024-11-05 RX ADMIN — METHADONE HYDROCHLORIDE 20 MILLIGRAM(S): 10 TABLET ORAL at 10:39

## 2024-11-05 RX ADMIN — NICOTINE POLACRILEX 1 PATCH: 4 GUM, CHEWING ORAL at 19:09

## 2024-11-05 RX ADMIN — Medication 1000 MILLIGRAM(S): at 12:10

## 2024-11-05 RX ADMIN — NICOTINE POLACRILEX 1 PATCH: 4 GUM, CHEWING ORAL at 12:01

## 2024-11-05 RX ADMIN — Medication 2 MILLIGRAM(S): at 18:37

## 2024-11-05 NOTE — PROGRESS NOTE ADULT - TIME BILLING
Time spent includes direct patient care (interview and examination of patient), discussion with other providers, support staff and/or patient's family members, review of medical records, ordering diagnostic tests and analyzing results, and documentation.
Time spent includes direct patient care  (interview and examination of patient), discussion with other providers, support staff and/or patient's family members, review of medical records, ordering diagnostic tests and analyzing results, and documentation.

## 2024-11-05 NOTE — DISCHARGE NOTE PROVIDER - NSDCCPCAREPLAN_GEN_ALL_CORE_FT
PRINCIPAL DISCHARGE DIAGNOSIS  Diagnosis: Alcohol withdrawal  Assessment and Plan of Treatment: You presented to the hospital in acute alcohol withdrawal, this refers to symptoms that may occur when a person who has been drinking too much alcohol on a regular basis suddenly stops drinking alcohol. Symptoms tend to occur within 8 hours after the last drink, but can occur days later. Symptoms tend to peak by 24 to 72 hours, but may go on for weeks. This was treated with a benzodiapines and this has now resolved. It is important to go to a living situation that supports you in avoiding unhealthy alcohol use. Some areas have housing options that provide a supportive environment for those trying to stay sober.  Total and lifelong avoidance of alcohol (abstinence) is the best treatment for those who have gone through alcohol withdrawal.  Support Groups: Alcoholics Anonymous -- www.aa.org  [facility] [facility]/Al-An[place]lee -- al-anon.org  National [place] on Alcohol Abuse and Alcoholism -- www.niaaa.nih.gov  SMART Recovery - www.[employer].org/  Substance Abuse and Mental Health Services Administration -- www.samhsa.gov/atod/alcohol      SECONDARY DISCHARGE DIAGNOSES  Diagnosis: Opioid dependence with withdrawal  Assessment and Plan of Treatment: Opiates or opioids are drugs used to treat pain. If you stop or cut back on any of these drugs after heavy use for a few weeks or more, you will have a number of symptoms, this is called withdrawal. Symptoms are very uncomfortable but are not life threatening. Symptoms usually start within 12 - 30 hours of last use. You were started on methadone which relieves withdrawal symptoms and helps with detox. It is also used as a long-term maintenance medicine for opioid dependence. After a period of maintenance, the dose may be decreased slowly over a long time. This helps reduce the intensity of withdrawal symptoms. Some people stay on methadone for years. Continue following up with the Methadone clinic for your prescription. More information and support for people addicted to opiates and their families can be found at:  Narcotics Anonymous -- www.na.org  Substance Abuse and Mental Health Services Administration (SAMHSA) - www.samhsa.gov/find-support  SMART Recovery -- [employer].org      Diagnosis: Current smoker  Assessment and Plan of Treatment: Quitting smoking can dramatically improve your health and help you live longer. It lowers your risk of heart disease, lung disease, kidney failure, cancer, infection, stomach problems, and diabetes.  Quitting is not easy for most people. It might take several tries to completely quit. But help and support are available. Quitting smoking will improve your health no matter how old you are, even if you have smoked for a long time. Research the best way to quit, discuss with your doctor medicines to reduce your:  ?Craving for cigarettes  ?"Withdrawal" symptoms (symptoms that happen when you stop smoking)  Your doctor or nurse can also help you find a counselor to talk to. For most people who are trying to quit smoking, it works best to use both medicines and counseling.  You can also get help from a free phone line (0-181-QUIT-NOW, or 1-600.495.7243) or go online to www.smokefree.gov.

## 2024-11-05 NOTE — DISCHARGE NOTE PROVIDER - CARE PROVIDER_API CALL
Mark Babin [facility]  Internal Medicine  [address], [address]  [place] [address]  Phone: [phone]  Fax: [phone]  Follow Up Time: 1 week

## 2024-11-05 NOTE — DISCHARGE NOTE PROVIDER - ATTENDING DISCHARGE PHYSICAL EXAMINATION:
CONSTITUTIONAL: Well appearing, well nourished, awake, alert and in no apparent distress  CARDIAC: Normal rate, regular rhythm.  Heart sounds S1, S2.  No murmurs, rubs or gallops   GI: abd soft, non tender, BS present  RESPIRATORY: Breath sounds clear and equal bilaterally. No wheezes, rhales or rhonchi  MUSCULOSKELETAL: Spine appears normal, range of motion is not limited, no muscle or joint tenderness  EXTREMITIES: No edema, cyanosis or deformity   NEUROLOGICAL: Alert and oriented, no focal deficits, no motor or sensory deficits.  SKIN: No rash, skin turgor

## 2024-11-05 NOTE — DISCHARGE NOTE PROVIDER - HOSPITAL COURSE
36 y/o male, from home, h/o alcohol use disorder, polysubstance use disorder presents to ED with headache and generalized body ache which started this morning associated with increased sweating, chills and rigor. Admitted for polysubstance drug withdrawal. Pain management consulted, started on methadone and well tolerated   SW following for outpatient methadone clinic    Given clinical course decision made to discharge patient with outpatient follow up  Dischrge discussed with attending   This is only a brief summary of patient's hospital stay, for full course see EMR 36 y/o male, from home, h/o alcohol use disorder, polysubstance use disorder presents to ED with headache and generalized body ache which started this morning associated with increased sweating, chills and rigor. Admitted for polysubstance drug withdrawal which improved during stay, did not require any long taper. Pain management consulted, started on methadone and well tolerated   SW following for outpatient methadone clinic needs to go there tomorrow AM (on 11/7)    Given clinical course decision made to discharge patient with outpatient follow up  Discharge discussed with attending   This is only a brief summary of patient's hospital stay, for full course see EMR

## 2024-11-05 NOTE — PROGRESS NOTE ADULT - SUBJECTIVE AND OBJECTIVE BOX
Patient is a 33y old  Male who presents with a chief complaint of Opioid withdrawal (04 Nov 2024 11:30)      OVERNIGHT EVENTS:    REVIEW OF SYSTEMS:  CONSTITUTIONAL: No fever, chills  ENMT:  No difficulty hearing, no change in vision  NECK: No pain or stiffness  RESPIRATORY: No cough, SOB  CARDIOVASCULAR: No chest pain, palpitations  GASTROINTESTINAL: No abdominal pain. No nausea, vomiting, or diarrhea  GENITOURINARY: No dysuria  NEUROLOGICAL: No HA  SKIN: No itching, burning, rashes, or lesions   LYMPH NODES: No enlarged glands  ENDOCRINE: No heat or cold intolerance; No hair loss  MUSCULOSKELETAL: No joint pain or swelling; No muscle, back, or extremity pain  PSYCHIATRIC: No depression, anxiety  HEME/LYMPH: No easy bruising, or bleeding gums    T(C): 36.3 (11-05-24 @ 05:00), Max: 36.7 (11-04-24 @ 11:55)  HR: 61 (11-05-24 @ 05:00) (61 - 79)  BP: 119/69 (11-05-24 @ 05:00) (119/69 - 142/91)  RR: 16 (11-05-24 @ 05:00) (15 - 16)  SpO2: 97% (11-05-24 @ 05:00) (97% - 99%)  Wt(kg): --Vital Signs Last 24 Hrs  T(C): 36.3 (05 Nov 2024 05:00), Max: 36.7 (04 Nov 2024 11:55)  T(F): 97.4 (05 Nov 2024 05:00), Max: 98 (04 Nov 2024 11:55)  HR: 61 (05 Nov 2024 05:00) (61 - 79)  BP: 119/69 (05 Nov 2024 05:00) (119/69 - 142/91)  BP(mean): 86 (04 Nov 2024 21:00) (86 - 86)  RR: 16 (05 Nov 2024 05:00) (15 - 16)  SpO2: 97% (05 Nov 2024 05:00) (97% - 99%)    Parameters below as of 05 Nov 2024 05:00  Patient On (Oxygen Delivery Method): room air          PHYSICAL EXAM:  GENERAL: NAD  EYES: clear conjunctiva; EOMI  ENMT: Moist mucous membranes  NECK: Supple, No JVD, Normal thyroid  CHEST/LUNG: Clear to auscultation bilaterally; No rales, rhonchi, wheezing, or rubs  HEART: S1, S2, Regular rate and rhythm  ABDOMEN: Soft, Nontender, Nondistended; Bowel sounds present  NEURO: Alert & Oriented X3  EXTREMITIES: No LE edema, no calf tenderness  LYMPH: No lymphadenopathy noted  SKIN: No rashes or lesions    Consultant(s) Notes Reviewed:  [x ] YES  [ ] NO  Care Discussed with Consultants/Other Providers [ x] YES  [ ] NO  LABS:                        15.9   8.90  )-----------( 281      ( 05 Nov 2024 05:50 )             45.9     11-05    140  |  108  |  21[H]  ----------------------------<  98  3.8   |  25  |  0.96    Ca    9.4      05 Nov 2024 05:50  Phos  4.0     11-04  Mg     2.0     11-05    TPro  7.6  /  Alb  3.9  /  TBili  0.4  /  DBili  x   /  AST  14  /  ALT  24  /  AlkPhos  72  11-04      CAPILLARY BLOOD GLUCOSE          Urinalysis Basic - ( 05 Nov 2024 05:50 )    Color: x / Appearance: x / SG: x / pH: x  Gluc: 98 mg/dL / Ketone: x  / Bili: x / Urobili: x   Blood: x / Protein: x / Nitrite: x   Leuk Esterase: x / RBC: x / WBC x   Sq Epi: x / Non Sq Epi: x / Bacteria: x        RADIOLOGY & ADDITIONAL TESTS:  Imaging Personally Reviewed:  [ ] YES  [ ] NO   Patient is a 33y old  Male who presents with a chief complaint of Opioid withdrawal (04 Nov 2024 11:30)      OVERNIGHT EVENTS: none noted     REVIEW OF SYSTEMS:  CONSTITUTIONAL: No fever, chills  RESPIRATORY: No cough, SOB  CARDIOVASCULAR: No chest pain, palpitations  GASTROINTESTINAL: No abdominal pain. No nausea, vomiting, or diarrhea  GENITOURINARY: No dysuria  NEUROLOGICAL: No HA  SKIN: No itching, burning, rashes, or lesions   LYMPH NODES: No enlarged glands  ENDOCRINE: No heat or cold intolerance; No hair loss  MUSCULOSKELETAL: No joint pain or swelling; No muscle, back, or extremity pain  PSYCHIATRIC: No depression, anxiety  HEME/LYMPH: No easy bruising, or bleeding gums    T(C): 36.3 (11-05-24 @ 05:00), Max: 36.7 (11-04-24 @ 11:55)  HR: 61 (11-05-24 @ 05:00) (61 - 79)  BP: 119/69 (11-05-24 @ 05:00) (119/69 - 142/91)  RR: 16 (11-05-24 @ 05:00) (15 - 16)  SpO2: 97% (11-05-24 @ 05:00) (97% - 99%)  Wt(kg): --Vital Signs Last 24 Hrs  T(C): 36.3 (05 Nov 2024 05:00), Max: 36.7 (04 Nov 2024 11:55)  T(F): 97.4 (05 Nov 2024 05:00), Max: 98 (04 Nov 2024 11:55)  HR: 61 (05 Nov 2024 05:00) (61 - 79)  BP: 119/69 (05 Nov 2024 05:00) (119/69 - 142/91)  BP(mean): 86 (04 Nov 2024 21:00) (86 - 86)  RR: 16 (05 Nov 2024 05:00) (15 - 16)  SpO2: 97% (05 Nov 2024 05:00) (97% - 99%)    Parameters below as of 05 Nov 2024 05:00  Patient On (Oxygen Delivery Method): room air          PHYSICAL EXAM:  GENERAL: NAD  CHEST/LUNG: Clear to auscultation bilaterally; No rales, rhonchi, wheezing, or rubs  HEART: S1, S2, Regular rate and rhythm  ABDOMEN: Soft, Nontender, Nondistended; Bowel sounds present  NEURO: Alert & Oriented X3  EXTREMITIES: No LE edema, no calf tenderness  LYMPH: No lymphadenopathy noted  SKIN: No rashes or lesions    Consultant(s) Notes Reviewed:  [x ] YES  [ ] NO  Care Discussed with Consultants/Other Providers [ x] YES  [ ] NO  LABS:                        15.9   8.90  )-----------( 281      ( 05 Nov 2024 05:50 )             45.9     11-05    140  |  108  |  21[H]  ----------------------------<  98  3.8   |  25  |  0.96    Ca    9.4      05 Nov 2024 05:50  Phos  4.0     11-04  Mg     2.0     11-05    TPro  7.6  /  Alb  3.9  /  TBili  0.4  /  DBili  x   /  AST  14  /  ALT  24  /  AlkPhos  72  11-04      CAPILLARY BLOOD GLUCOSE          Urinalysis Basic - ( 05 Nov 2024 05:50 )    Color: x / Appearance: x / SG: x / pH: x  Gluc: 98 mg/dL / Ketone: x  / Bili: x / Urobili: x   Blood: x / Protein: x / Nitrite: x   Leuk Esterase: x / RBC: x / WBC x   Sq Epi: x / Non Sq Epi: x / Bacteria: x        RADIOLOGY & ADDITIONAL TESTS:  Imaging Personally Reviewed:  [ ] YES  [ ] NO

## 2024-11-05 NOTE — DISCHARGE NOTE PROVIDER - NSDCFUADDAPPT_GEN_ALL_CORE_FT
APPTS ARE READY TO BE MADE: [ ] YES    Best Family or Patient Contact (if needed):    Additional Information about above appointments (if needed):    1: Will go to methadone clinic tomorrow am   2: PCP Dr. Babin [phone]  3:     Other comments or requests:

## 2024-11-05 NOTE — PROGRESS NOTE ADULT - NS ATTEND AMEND GEN_ALL_CORE FT
35M from home with PMH polysubstance abuse (alcohol, crack cocaine, oxycodone, fentanyl, marijuana), who presented to the ED with generalized weakness and symptoms concerning for opioid withdrawal such as loose stools and yawning. Admitted for further management but has been improving since admission.       Patient seen and examined at bedside. No acute events overnight. Still reports chronic lower back pain but otherwise no other complaints and pain is better since starting methadone. He is medically cleared but understands that will need to wait for MMTP set up outpatient.      Labs and imaging reviewed.                          15.9   8.90  )-----------( 281      ( 2024 05:50 )             45.9   140  |  108  |  21[H]  ----------------------------( 98     -05 @ 05:50  3.8   |  25  |  0.96    Ca: 9.4   Phos: x    M.0    TPro: x  / Alb: x  /  TBili x  / DBili x  /  AST x  /  ALT x  /  AlkPhos  x     Exam:   NAD, lying in bed    RRR   CTABL   Abdo soft and ND NT BS+   Ext no LE swelling, wwp   AOx3 no FND     A/P:   #Polysubstance abuse    #Opioid withdrawal    #EtOH withdrawal    #Chronic back pain     -urine drug screen positive benzodiazepines and cocaine   -pain management recs appreciated    -c/w methadone 20mg daily and waiting for MMTP setup outpatient    -monitor on CIWA – no longer scoring (between around 1-3)   -folic acid, thiamine, MVI   -c/w nicotine patch and tylenol prn   -SW consult for methadone clinic set up, medically cleared
35M from home with PMH polysubstance abuse (alcohol, crack cocaine, oxycodone, fentanyl, marijuana), who presented to the ED with generalized weakness and symptoms concerning for opioid withdrawal such as loose stools and yawning. Admitted for further management.     Patient seen and examined at bedside. No acute events overnight. Still reports chronic lower back pain but otherwise no other complaints.     Labs and imaging reviewed.   Exam includes NAD, RRR, CTAB, abd soft and NT, no LE swelling, AOx3    #Polysubstance abuse   #Opioid withdrawal   #EtOH withdrawal   #Chronic back pain  -urine drug screen positive benzodiazepines and cocaine  -pain management recs appreciated - start methadone 20mg  -monitor on CIWA  -folic acid, thiamine, MVI  -nicotine patch   -tylenol prn  -SW consult

## 2024-11-05 NOTE — PROGRESS NOTE ADULT - SUBJECTIVE AND OBJECTIVE BOX
Source of information: ROSALVA HERNANDEZ, Chart review  Patient language: English  : n/a    HPI:  35y/M, from home, h/o alcohol use disorder, polysubstance use disorder presents to ED with headache and generalised body ache which started this morning associated with increased sweating, chills and rigor, denies fever. Mentions runny nose, frequent yawning and multiple episodes of loose stool. Denies nausea and vomiting, abd pain, hallucination. Denies chest pain, palpitation, SOB, numbness and focal weakness. Patient admits to polysubstance use, drinks 10 drinks per day, smokes marijuana, smokes cocaine, sniffs fentanyl and takes oxycodone pills whenever he can get his hands on it. Last drink was on Friday, last cocaine, fentanyl and oxy use was also on Friday.   States he was on Suboxon for 6months and stopped on last week on August and went back to abusing.  s/p valium in ED (03 Nov 2024 00:27)    Pt is admitted for ETOH and opioid withdrawal, currently on [facility] protocol. Pain service consulted for management of polysubstance use disorder. Patient reports using Fentanyl, crack cocaine, and "2-3 tall boy beers daily." Patient endorses history of left foot pain due to bone spurs and chronic back pain. Utox positive for cocaine and THC. Pt. reports being on Suboxone in the past and last taking it in August of this year when he had a relapse and starting using drugs again, verified on ISTOP. Patient expressed desire to try Methadone in order to get "clean," education of medications for opioid use disorder provided and patient verbalized understanding. Patient also informed that he would also need to be enrolled in an outpatient Methadone program in order to continue treatment once discharged from the hospital, he verbalized understanding and is in agreement with plan of care.  Pt seen and examined at bedside, this morning. At time of assessment, patient resting in bed in NAD. COWS Score 0,  reports pain score 4/10  SCALE USED: (1-10 VNRS).  Pt tolerating PO diet. Denies lethargy, chest pain, SOB, nausea, vomiting, constipation. Reports last BM 11/4. Patient stated goal for pain control: to be able to take deep breaths, get out of bed to chair and ambulate with tolerable pain control. Pt denies taking medications for pain at home.     PAST MEDICAL & SURGICAL HISTORY:  Polysubstance abuse    No Past Surgical History    FAMILY HISTORY:    Social History:  Lives alone, currently unemployed (03 Nov 2024 00:27)  [x] Reports current cigarette smoking, marijuana, ETOH and illicit drug use disorder.    Allergies    ibuprofen (Hives)    Intolerances    MEDICATIONS  (STANDING):  folic acid 1 milliGRAM(s) Oral daily  lidocaine   4% Patch 1 Patch Transdermal daily  methadone    Tablet 20 milliGRAM(s) Oral <User Schedule>  multivitamin 1 Tablet(s) Oral daily  nicotine - 21 mG/24Hr(s) Patch 1 Patch Transdermal daily    MEDICATIONS  (PRN):  acetaminophen     Tablet .. 1000 milliGRAM(s) Oral every 6 hours PRN Moderate Pain (4 - 6)  LORazepam     Tablet 2 milliGRAM(s) Oral every 2 hours PRN Symptom-triggered 2 point increase in CIWA-Ar  LORazepam   Injectable 2 milliGRAM(s) IV Push every 1 hour PRN Symptom-triggered: each CIWA -Ar score 8 or GREATER  nicotine  Polacrilex Gum 2 milliGRAM(s) Oral every 4 hours PRN anxiety/smoking cessation    Vital Signs Last 24 Hrs  T(C): 36.3 (05 Nov 2024 05:00), Max: 36.7 (04 Nov 2024 11:55)  T(F): 97.4 (05 Nov 2024 05:00), Max: 98 (04 Nov 2024 11:55)  HR: 61 (05 Nov 2024 05:00) (61 - 79)  BP: 119/69 (05 Nov 2024 05:00) (119/69 - 142/91)  BP(mean): 86 (04 Nov 2024 21:00) (86 - 86)  RR: 16 (05 Nov 2024 05:00) (15 - 16)  SpO2: 97% (05 Nov 2024 05:00) (97% - 99%)    Parameters below as of 05 Nov 2024 05:00  Patient On (Oxygen Delivery Method): room air    LABS: Reviewed                          15.9   8.90  )-----------( 281      ( 05 Nov 2024 05:50 )             45.9     11-05    140  |  108  |  21[H]  ----------------------------<  98  3.8   |  25  |  0.96    Ca    9.4      05 Nov 2024 05:50  Phos  4.0     11-04  Mg     2.0     11-05    TPro  7.6  /  Alb  3.9  /  TBili  0.4  /  DBili  x   /  AST  14  /  ALT  24  /  AlkPhos  72  11-04    LIVER FUNCTIONS - ( 04 Nov 2024 05:52 )  Alb: 3.9 g/dL / Pro: 7.6 g/dL / ALK PHOS: 72 U/L / ALT: 24 U/L DA / AST: 14 U/L / GGT: x           Urinalysis Basic - ( 05 Nov 2024 05:50 )    Color: x / Appearance: x / SG: x / pH: x  Gluc: 98 mg/dL / Ketone: x  / Bili: x / Urobili: x   Blood: x / Protein: x / Nitrite: x   Leuk Esterase: x / RBC: x / WBC x   Sq Epi: x / Non Sq Epi: x / Bacteria: x    CAPILLARY BLOOD GLUCOSE    Radiology: None to be Reviewed.     ORT Score -   Family Hx of substance abuse	Female	      Male  Alcohol 	                                           1                     3  Illegal drugs	                                   2                     3  Rx drugs                                           4 	                  4  Personal Hx of substance abuse		  Alcohol 	                                          3	                  3  Illegal drugs                                     4	                  4  Rx drugs                                            5 	                  5  Age between 16- 45 years	           1                     1  hx preadolescent sexual abuse	   3 	                  0  Psychological disease		  ADD, OCD, bipolar, schizophrenia   2	          2  Depression                                           1 	          1  Total: 8    a score of 3 or lower indicates low risk for opioid abuse		  a score of 4-7 indicates moderate risk for opioid abuse		  a score of 8 or higher indicates high risk for opioid abuse    COWS   Resting Pulse Rate: beats/minute  Measured after patient is sitting or lying for one minute  0 pulse rate 80 or below  1 pulse rate 81-1 00  2 pulse rate 101-120  4 pulse rate greater than 1 20  GI Upset: over last 1/2 hour  0 no GI symptoms  1 stomach cramps  2 nausea or loose stool  3 vomiting or diarrhea  5 multiple episodes of diarrhea or vomiting  Sweating: over past 1/2 hour not accounted for by  room temperature or patient activity.  0 no report of chills or flushing  1 subjective report of chills or flushing  2 flushed or observable moistness on face  3 beads of sweat on brow or face  4 sweat streaming off face  Tremor observation of outstretched hands  0 no tremor  1 tremor can be felt, but not observed  2 slight tremor observable  4 gross tremor or muscle twitching  Restlessness Observation during assessment  0 able to sit still  1 reports difficulty sitting still, but is able to do so  3 frequent shifting or extraneous movements of legs/arms  5 unable to sit still for more than a few seconds  Yawning Observation during assessment  0 no yawning  1 yawning once or twice during assessment  2 yawning three or more times during assessment  4 yawning several times/minute  Pupil size  0 pupils pinned or normal size for room light  1 pupils possibly larger than normal for room light  2 pupils moderately dilated  5 pupils so dilated that only the rim of the iris is visible  Anxiety or Irritability  0 none  1 patient reports increasing irritability or anxiousness  2 patient obviously irritable or anxious  4 patient so irritable or anxious that participation in  the assessment is difficult  Bone or Joint aches if patient was having pain  previously, only the additional component attributed  to opiates withdrawal is scored  0 not present  1 mild diffuse discomfort  2 patient reports severe diffuse aching of joints/muscles  4 patient is rubbing joints or muscles and is unable to sit  still because of discomfort  Gooseflesh skin  0 skin is smooth  3 piloerrection of skin can be felt or hairs standing up  on arms  5 prominent piloerrection  Runny nose or tearing Not accounted for by cold  -symptoms or allergies  0 not present  1 nasal stuffiness or unusually moist eyes  2 nose running or tearing  4 nose constantly running or tears streaming down cheeks  Total Score 0  The total score is the sum of all 11 items  21  Initials of person  completing assessment: SL  Score: 5- 12 = mild; 1 3-24 = moderate; 25-36 = moderately severe; more than 36 = severe withdrawal   	  REVIEW OF SYSTEMS:  CONSTITUTIONAL: No fever or fatigue  HEENT:  No difficulty hearing, no change in vision  NECK: No pain or stiffness  RESPIRATORY: No cough, wheezing, chills or hemoptysis; No shortness of breath  CARDIOVASCULAR: No chest pain, palpitations, dizziness, or leg swelling  GASTROINTESTINAL: No loss of appetite, decreased PO intake. No abdominal or epigastric pain. No nausea, vomiting; No diarrhea or constipation.   GENITOURINARY: No dysuria, frequency, hematuria, retention or incontinence  MUSCULOSKELETAL: + chronic left foot pain; + chronic low back pain, no upper or lower motor strength weakness, no saddle anesthesia, bowel/bladder incontinence, no falls   NEURO: No headaches, No numbness/tingling b/l LE, No weakness  ENDOCRINE: No polyuria, polydipsia, heat or cold intolerance; No hair loss  PSYCHIATRIC: No depression, anxiety or difficulty sleeping    PHYSICAL EXAM:  GENERAL:  Alert & Oriented X4,  NAD, Speech is clear.   RESPIRATORY: Respirations even and unlabored. Clear to auscultation bilaterally  CARDIOVASCULAR: Normal S1/S2, regular rate and rhythm  GASTROINTESTINAL:  Soft, Nontender, Nondistended; Bowel sounds present  PERIPHERAL VASCULAR:  Extremities warm without edema. 2+ Peripheral Pulses, No cyanosis, No calf tenderness  MUSCULOSKELETAL: Motor Strength 5/5 B/L upper and lower extremities; moves all extremities equally against gravity; ROM intact; negative SLR; No tenderness on palpation of all joints.   SKIN: Warm, dry, intact.    Risk factors associated with adverse outcomes related to opioid treatment  [ ] Concurrent benzodiazepine use  [x] History/ Active substance use or alcohol use disorder  [ ] Psychiatric co-morbidity  [ ] Sleep apnea  [ ] COPD  [ ] BMI> 35  [ ] Liver dysfunction  [ ] Renal dysfunction  [ ] CHF  [ ] Smoker  [ ] Age > 60 years    [x]  [place]  Reviewed and Copied to Chart. See below.    Plan of care and goal oriented pain management treatment options were discussed with patient and /or primary care giver; all questions and concerns were addressed and care was aligned with patient's wishes.    Educated patient on goal oriented pain management treatment options

## 2024-11-06 ENCOUNTER — TRANSCRIPTION ENCOUNTER (OUTPATIENT)
Age: 34
End: 2024-11-06

## 2024-11-06 VITALS
OXYGEN SATURATION: 97 % | TEMPERATURE: 98 F | RESPIRATION RATE: 16 BRPM | SYSTOLIC BLOOD PRESSURE: 138 MMHG | HEART RATE: 78 BPM | DIASTOLIC BLOOD PRESSURE: 88 MMHG

## 2024-11-06 LAB
ALBUMIN SERPL ELPH-MCNC: 3.5 G/DL — SIGNIFICANT CHANGE UP (ref 3.5–5)
ALP SERPL-CCNC: 68 U/L — SIGNIFICANT CHANGE UP (ref 40–120)
ALT FLD-CCNC: 27 U/L DA — SIGNIFICANT CHANGE UP (ref 10–60)
ANION GAP SERPL CALC-SCNC: 5 MMOL/L — SIGNIFICANT CHANGE UP (ref 5–17)
AST SERPL-CCNC: 16 U/L — SIGNIFICANT CHANGE UP (ref 10–40)
BILIRUB SERPL-MCNC: 0.4 MG/DL — SIGNIFICANT CHANGE UP (ref 0.2–1.2)
BUN SERPL-MCNC: 18 MG/DL — SIGNIFICANT CHANGE UP (ref 7–18)
CALCIUM SERPL-MCNC: 9.1 MG/DL — SIGNIFICANT CHANGE UP (ref 8.4–10.5)
CHLORIDE SERPL-SCNC: 108 MMOL/L — SIGNIFICANT CHANGE UP (ref 96–108)
CO2 SERPL-SCNC: 27 MMOL/L — SIGNIFICANT CHANGE UP (ref 22–31)
CREAT SERPL-MCNC: 0.91 MG/DL — SIGNIFICANT CHANGE UP (ref 0.5–1.3)
EGFR: 113 ML/MIN/1.73M2 — SIGNIFICANT CHANGE UP
GLUCOSE SERPL-MCNC: 92 MG/DL — SIGNIFICANT CHANGE UP (ref 70–99)
HCT VFR BLD CALC: 45.1 % — SIGNIFICANT CHANGE UP (ref 39–50)
HGB BLD-MCNC: 15.5 G/DL — SIGNIFICANT CHANGE UP (ref 13–17)
MAGNESIUM SERPL-MCNC: 2 MG/DL — SIGNIFICANT CHANGE UP (ref 1.6–2.6)
MCHC RBC-ENTMCNC: 30.4 PG — SIGNIFICANT CHANGE UP (ref 27–34)
MCHC RBC-ENTMCNC: 34.4 G/DL — SIGNIFICANT CHANGE UP (ref 32–36)
MCV RBC AUTO: 88.4 FL — SIGNIFICANT CHANGE UP (ref 80–100)
NRBC # BLD: 0 /100 WBCS — SIGNIFICANT CHANGE UP (ref 0–0)
PLATELET # BLD AUTO: 248 K/UL — SIGNIFICANT CHANGE UP (ref 150–400)
POTASSIUM SERPL-MCNC: 3.8 MMOL/L — SIGNIFICANT CHANGE UP (ref 3.5–5.3)
POTASSIUM SERPL-SCNC: 3.8 MMOL/L — SIGNIFICANT CHANGE UP (ref 3.5–5.3)
PROT SERPL-MCNC: 7.1 G/DL — SIGNIFICANT CHANGE UP (ref 6–8.3)
RBC # BLD: 5.1 M/UL — SIGNIFICANT CHANGE UP (ref 4.2–5.8)
RBC # FLD: 12.1 % — SIGNIFICANT CHANGE UP (ref 10.3–14.5)
SODIUM SERPL-SCNC: 140 MMOL/L — SIGNIFICANT CHANGE UP (ref 135–145)
WBC # BLD: 7.82 K/UL — SIGNIFICANT CHANGE UP (ref 3.8–10.5)
WBC # FLD AUTO: 7.82 K/UL — SIGNIFICANT CHANGE UP (ref 3.8–10.5)

## 2024-11-06 PROCEDURE — 99239 HOSP IP/OBS DSCHRG MGMT >30: CPT

## 2024-11-06 PROCEDURE — 80307 DRUG TEST PRSMV CHEM ANLYZR: CPT

## 2024-11-06 PROCEDURE — 80053 COMPREHEN METABOLIC PANEL: CPT

## 2024-11-06 PROCEDURE — 36415 COLL VENOUS BLD VENIPUNCTURE: CPT

## 2024-11-06 PROCEDURE — 84100 ASSAY OF PHOSPHORUS: CPT

## 2024-11-06 PROCEDURE — 87640 STAPH A DNA AMP PROBE: CPT

## 2024-11-06 PROCEDURE — 96374 THER/PROPH/DIAG INJ IV PUSH: CPT

## 2024-11-06 PROCEDURE — 85027 COMPLETE CBC AUTOMATED: CPT

## 2024-11-06 PROCEDURE — 87641 MR-STAPH DNA AMP PROBE: CPT

## 2024-11-06 PROCEDURE — 80048 BASIC METABOLIC PNL TOTAL CA: CPT

## 2024-11-06 PROCEDURE — 83735 ASSAY OF MAGNESIUM: CPT

## 2024-11-06 PROCEDURE — 85025 COMPLETE CBC W/AUTO DIFF WBC: CPT

## 2024-11-06 PROCEDURE — 99285 EMERGENCY DEPT VISIT HI MDM: CPT

## 2024-11-06 RX ADMIN — METHADONE HYDROCHLORIDE 20 MILLIGRAM(S): 10 TABLET ORAL at 08:42

## 2024-11-06 RX ADMIN — LIDOCAINE HYDROCHLORIDE 1 PATCH: 40 SOLUTION TOPICAL at 03:19

## 2024-11-06 RX ADMIN — Medication 1000 MILLIGRAM(S): at 12:34

## 2024-11-06 RX ADMIN — FOLIC ACID 1 MILLIGRAM(S): 1 TABLET ORAL at 12:32

## 2024-11-06 RX ADMIN — Medication 2 MILLIGRAM(S): at 05:11

## 2024-11-06 RX ADMIN — LIDOCAINE HYDROCHLORIDE 1 PATCH: 40 SOLUTION TOPICAL at 12:31

## 2024-11-06 RX ADMIN — Medication 1 TABLET(S): at 12:32

## 2024-11-06 RX ADMIN — NICOTINE POLACRILEX 2 MILLIGRAM(S): 4 GUM, CHEWING ORAL at 12:55

## 2024-11-06 RX ADMIN — NICOTINE POLACRILEX 1 PATCH: 4 GUM, CHEWING ORAL at 13:06

## 2024-11-06 NOTE — PROGRESS NOTE ADULT - PROBLEM SELECTOR PLAN 4
pt smokes 1 pack a day  change to nicotine 21 mg qd  add PRN nicotine gum 2 mg q4h PRN

## 2024-11-06 NOTE — DISCHARGE NOTE NURSING/CASE MANAGEMENT/SOCIAL WORK - NSDCFUADDAPPT_GEN_ALL_CORE_FT
APPTS ARE READY TO BE MADE: [ ] YES    Best Family or Patient Contact (if needed):    Additional Information about above appointments (if needed):    1: Will go to methadone clinic tomorrow am   2: PCP Dr. Babin [phone]  3:     Other comments or requests:

## 2024-11-06 NOTE — DISCHARGE NOTE NURSING/CASE MANAGEMENT/SOCIAL WORK - FINANCIAL ASSISTANCE
[facility] provides services at a reduced cost to those who are determined to be eligible through [facility]’s financial assistance program. Information regarding [facility]’s financial assistance program can be found by going to [email or web address] or by calling [phone].

## 2024-11-06 NOTE — DISCHARGE NOTE NURSING/CASE MANAGEMENT/SOCIAL WORK - NSDCPEFALRISK_GEN_ALL_CORE
For information on Fall & Injury Prevention, visit: [email or web address] OR  [email or web address] OR  https://www.cdc.gov/steadi/patient.html

## 2024-11-06 NOTE — PROGRESS NOTE ADULT - PROBLEM SELECTOR PLAN 1
Pt with history of polysubstance use disorder currently on CIWA protocol. Patient reports using Fentanyl, crack cocaine, and "2-3 tall boy beers daily." Utox positive for cocaine and THC. Pt. reports being on Suboxone in the past and last taking it in August of this year when he had a relapse and starting using drugs again, verified on ISTOP. Patient expressed desire to try Methadone in order to get "clean." ORT 8 indicating high rate for opioid abuse.   Opioid pain recommendations   - Continue Methadone 20mg PO daily, initiated on 11/4. Monitor for respiratory depression/sedation. QTc 460  - Patient will need SW consult for enrollment in a Methadone clinic  Non-opioid pain recommendations   - No NSAIDs due to allergy  - Continue Acetaminophen 1 gram PO q 6 hours PRN for moderate pain.  - Continue Lidoderm 4% patch daily for back pain. (12 hrs on/12 hrs off)  Bowel Regimen  - Continue Miralax 17G PO daily  - Continue Senna 2 tablets at bedtime for constipation  Mild pain (score 1-3)  - Non-pharmacological pain treatment recommendations  - Warm/ Cool packs PRN   - Repositioning extremity, elevation, imagery, relaxation, distraction.  - Physical therapy OOB if no contraindications   Recommendations discussed with primary team and RN.
hx of chronic lower back pain  on home med Suboxone 8/2mg bid   EKG showing NSR with QTc 460s   - c/w methadone 20 mg qd   - pain management following  - CM on board for outpt methadone clinic arrangements
hx of chronic lower back pain  sniffs fentanyl and uses oxycodone pill  s/p Valium in ED  on home med Suboxone 8/2mg bid   EKG showing NSR with QTc 460s   - started on methadone 20 mg qd   - pain management following
hx of chronic lower back pain  on home med Suboxone 8/2mg bid   EKG showing NSR with QTc 460s   - c/w methadone 20 mg qd   - pain management following

## 2024-11-06 NOTE — PROGRESS NOTE ADULT - PROBLEM SELECTOR PLAN 2
last drink on Friday - drank 3 beers   BAL <3  CIWA 1-4 over the past 24 hours   noted with mild anxiety  -c/w symptom triggered ativan  -c/w thiamine, folic acid and multivitamin qd  -CIWA is 0-1  -monitor CIWA
CIWA 1-4 over the past 24 hours   noted with mild anxiety  -c/w symptom triggered ativan  -c/w thiamine, folic acid and multivitamin qd  -CIWA is 0-1  -monitor CIWA
last drink on Friday - drank 3 beers   BAL <3  CIWA 1-4 over the past 24 hours   noted with mild anxiety  -c/w symptom triggered ativan  -c/w thiamine, folic acid and multivitamin qd  -monitor CIWA

## 2024-11-06 NOTE — PROGRESS NOTE ADULT - PROBLEM SELECTOR PLAN 3
Patient admits to polysubstance use, drinks 10 drinks per day, smokes marijuana, smokes cocaine, sniffs fentanyl and takes oxycodone pills  -Utox cocaine and THC  -Pain management following  -SW following
Patient admits to polysubstance use, drinks 10 drinks per day, smokes marijuana, smokes cocaine, sniffs fentanyl and takes oxycodone pills  -Utox cocaine and THC  -Pain management following  -SW following for outp methadone clinic arrangements
Patient admits to polysubstance use, drinks 10 drinks per day, smokes marijuana, smokes cocaine, sniffs fentanyl and takes oxycodone pills  -Utox showing cocaine and THC  -Pain management following  -SW following

## 2024-11-06 NOTE — PROGRESS NOTE ADULT - ASSESSMENT
Search Terms: Mariano Mark, 1990Search Date: 11/04/2024 09:10:46 AM  The Drug Utilization Report below displays all of the controlled substance prescriptions, if any, that your patient has filled in the last twelve months. The information displayed on this report is compiled from pharmacy submissions to the Department, and accurately reflects the information as submitted by the pharmacies.    This report was requested by: Dulce Maria Montez | Reference #: [id number]    Practitioner Count: 0  Pharmacy Count: 0  Current Opioid Prescriptions: 0  Current Benzodiazepine Prescriptions: 0  Current Stimulant Prescriptions: 0      Patient Demographic Information (PDI)       PDI	First Name	Last Name	Birth Date	Gender	Street Address	[facility]	Zip Code  A	Mariano	Devivo	1990	Male	[address] [facility]	[address]  B	Mariano	Devivo	1990	Male	[address]	[place]	[address]  C	Mariano	Devivo	1990	Male	[address] [facility]	[address]  D	Mariano	Devivo	1990	Male	[address]	[facility]	[address]  E	Mariano	Devivo	1990	Male	[address]	[address]    Prescription Information      PDI Filter:    PDI	My Rx	Current Rx	Drug Type	Rx Written	Rx Dispensed	Drug	Quantity	Days Supply	Prescriber Name	Prescriber KEYANA #	Payment Method	Dispenser  A	N	N	O	06/22/2024	06/22/2024	buprenorphine-naloxone 8-2 mg sl film	60	30	Maicol Lanier	[id number]	Medicaid	[facility]  A	N	N	O	06/07/2024	06/08/2024	buprenorphine-naloxone 8-2 mg sl film	30	15	Yolande Maicol	[id number]	Medicaid	[facility]  B	N	N	O	03/25/2024	03/25/2024	buprenorphine-naloxone 8-2 mg sl film	28	14	Issa Robles MD	[id number]	Medicaid	[place]  C	N	N	O	05/14/2024	05/15/2024	buprenorphine-naloxone 8-2 mg sl film	14	7	Gonzalo Lugo	[id number]	Medicaid	[place]   C	N	N	O	05/07/2024	05/08/2024	buprenorphine-naloxone 8-2 mg sl film	14	7	Ramón Yeager	[id number]	Medicaid	[place]   D	N	N	O	07/31/2024	07/31/2024	buprenorphine-naloxone 8-2 mg sl film	42	21	Deshaun Ramos	[id number]	Medicaid	[id number]  D	N	N	O	07/22/2024	07/22/2024	buprenorphine-naloxone 8-2 mg sl film	14	7	Clinton Durbin (MSN)	[id number]	Medicaid	[id number]  E	N	N	O	04/05/2024	04/05/2024	buprenorphine-naloxone 8-2 mg sl film	60	30	Jason Mariee	[id number]	Medicaid	[id number]    * - Details of Drug Type : O = Opioid, B = Benzodiazepine, S = Stimulant
34 y/o male, from home, h/o alcohol use disorder, polysubstance use disorder presents to ED with headache and generalized body ache which started this morning associated with increased sweating, chills and rigor. Admitted for polysubstance drug withdrawal. Pain management consulted, started on methadone.   SW following for methadone clinic
34 y/o male, from home, h/o alcohol use disorder, polysubstance use disorder presents to ED with headache and generalized body ache which started this morning associated with increased sweating, chills and rigor. Admitted for polysubstance drug withdrawal. Pain management consulted, started on methadone. 
34 y/o male, from home, h/o alcohol use disorder, polysubstance use disorder presents to ED with headache and generalized body ache which started associated with increased sweating, chills and rigor. Admitted for polysubstance drug withdrawal. Pain management consulted, started on methadone and well tolerated   SW following for outpatient methadone clinic  Pt was seen at bedside, no complaints, CIWA 0

## 2024-11-06 NOTE — DISCHARGE NOTE NURSING/CASE MANAGEMENT/SOCIAL WORK - PATIENT PORTAL LINK FT
You can access the FollowMyHealth Patient Portal offered by [facility] by registering at the following website: [email or web address]. By joining [employer]’s FollowMyHealth portal, you will also be able to view your health information using other applications (apps) compatible with our system.

## 2025-07-30 NOTE — BH INPATIENT PSYCHIATRY ASSESSMENT NOTE - NSBHATTESTTYPEVISIT_PSY_A_CORE
Advance Care Planning     Advance Care Planning (ACP) Physician/NP/PA Conversation    Date of Conversation: 7/30/2025  Conducted with: Patient with Decision Making Capacity    Healthcare Decision Maker:      Primary Decision Maker: Joshua Mcnair - Spouse - [phone]    Click here to complete Healthcare Decision Makers including selection of the Healthcare Decision Maker Relationship (ie \"Primary\")  Today we documented Decision Maker(s) consistent with Legal Next of Kin hierarchy.    Care Preferences:    Hospitalization:  \"If your health worsens and it becomes clear that your chance of recovery is unlikely, what would be your preference regarding hospitalization?\"  The patient would prefer hospitalization.    Ventilation:  \"If you were unable to breath on your own and your chance of recovery was unlikely, what would be your preference about the use of a ventilator (breathing machine) if it was available to you?\"  The patient would desire the use of a ventilator.    Resuscitation:  \"In the event your heart stopped as a result of an underlying serious health condition, would you want attempts made to restart your heart, or would you prefer a natural death?\"  Yes, attempt to resuscitate.    ventilation preferences, hospitalization preferences, and resuscitation preferences    Conversation Outcomes / Follow-Up Plan:  ACP in process - information provided, considering goals and options  Reviewed DNR/DNI and patient elects Full Code (Attempt Resuscitation)    Length of Voluntary ACP Conversation in minutes:  16 minutes    SAULO GARCIA MD                        Attending Only